# Patient Record
Sex: FEMALE | Race: WHITE | NOT HISPANIC OR LATINO | Employment: OTHER | URBAN - METROPOLITAN AREA
[De-identification: names, ages, dates, MRNs, and addresses within clinical notes are randomized per-mention and may not be internally consistent; named-entity substitution may affect disease eponyms.]

---

## 2017-01-20 PROBLEM — G47.00 INSOMNIA: Status: ACTIVE | Noted: 2017-01-20

## 2017-01-24 ENCOUNTER — APPOINTMENT (OUTPATIENT)
Dept: RADIOLOGY | Facility: MEDICAL CENTER | Age: 82
DRG: 640 | End: 2017-01-24
Attending: HOSPITALIST
Payer: MEDICARE

## 2017-01-24 PROCEDURE — 71010 DX-CHEST-PORTABLE (1 VIEW): CPT

## 2017-02-01 PROBLEM — R10.9 ABDOMINAL PAIN: Status: ACTIVE | Noted: 2017-02-01

## 2017-02-18 ENCOUNTER — APPOINTMENT (OUTPATIENT)
Dept: RADIOLOGY | Facility: MEDICAL CENTER | Age: 82
DRG: 640 | End: 2017-02-18
Attending: HOSPITALIST
Payer: MEDICARE

## 2017-02-18 PROCEDURE — 71010 DX-CHEST-PORTABLE (1 VIEW): CPT

## 2017-02-19 ENCOUNTER — APPOINTMENT (OUTPATIENT)
Dept: RADIOLOGY | Facility: MEDICAL CENTER | Age: 82
DRG: 640 | End: 2017-02-19
Attending: INTERNAL MEDICINE
Payer: MEDICARE

## 2017-02-19 PROCEDURE — 71010 DX-CHEST-PORTABLE (1 VIEW): CPT

## 2017-04-11 PROBLEM — R10.9 ABDOMINAL PAIN: Status: RESOLVED | Noted: 2017-02-01 | Resolved: 2017-04-11

## 2017-04-18 PROBLEM — E87.6 HYPOKALEMIA: Status: ACTIVE | Noted: 2017-04-18

## 2017-04-18 PROBLEM — G89.29 CHRONIC PAIN: Status: ACTIVE | Noted: 2017-04-18

## 2017-04-18 PROBLEM — N39.0 UTI (URINARY TRACT INFECTION): Status: ACTIVE | Noted: 2017-04-18

## 2017-04-18 PROBLEM — E83.42 HYPOMAGNESEMIA: Status: ACTIVE | Noted: 2017-04-18

## 2017-04-25 PROBLEM — R35.0 URINARY FREQUENCY: Status: ACTIVE | Noted: 2017-04-25

## 2017-04-25 PROBLEM — E53.8 VITAMIN B12 DEFICIENCY: Status: ACTIVE | Noted: 2017-04-25

## 2017-04-25 PROBLEM — G92.8 TOXIC METABOLIC ENCEPHALOPATHY: Status: ACTIVE | Noted: 2017-04-25

## 2017-04-25 PROBLEM — Z71.89 DNR (DO NOT RESUSCITATE) DISCUSSION: Status: ACTIVE | Noted: 2017-04-25

## 2017-04-25 PROBLEM — E86.0 DEHYDRATION: Status: ACTIVE | Noted: 2017-04-25

## 2017-04-27 PROBLEM — R53.2 FUNCTIONAL QUADRIPLEGIA (HCC): Status: ACTIVE | Noted: 2017-04-27

## 2017-05-17 ENCOUNTER — APPOINTMENT (OUTPATIENT)
Dept: RADIOLOGY | Facility: MEDICAL CENTER | Age: 82
DRG: 640 | End: 2017-05-17
Attending: HOSPITALIST
Payer: MEDICARE

## 2017-05-18 ENCOUNTER — APPOINTMENT (OUTPATIENT)
Dept: RADIOLOGY | Facility: MEDICAL CENTER | Age: 82
DRG: 640 | End: 2017-05-18
Attending: HOSPITALIST
Payer: MEDICARE

## 2017-05-18 PROCEDURE — 77077 JOINT SURVEY SINGLE VIEW: CPT

## 2017-05-18 PROCEDURE — 74230 X-RAY XM SWLNG FUNCJ C+: CPT

## 2017-05-22 PROBLEM — M85.80 OSTEOPENIA: Status: ACTIVE | Noted: 2017-05-22

## 2017-05-22 PROBLEM — R53.1 GENERALIZED WEAKNESS: Status: ACTIVE | Noted: 2017-05-22

## 2017-05-22 PROBLEM — E55.9 VITAMIN D DEFICIENCY: Status: ACTIVE | Noted: 2017-05-22

## 2017-06-06 PROBLEM — E83.42 HYPOMAGNESEMIA: Status: RESOLVED | Noted: 2017-04-18 | Resolved: 2017-06-06

## 2017-06-06 PROBLEM — E86.0 DEHYDRATION: Status: RESOLVED | Noted: 2017-04-25 | Resolved: 2017-06-06

## 2017-06-06 PROBLEM — R53.1 GENERALIZED WEAKNESS: Status: RESOLVED | Noted: 2017-05-22 | Resolved: 2017-06-06

## 2017-06-06 PROBLEM — E87.6 HYPOKALEMIA: Status: RESOLVED | Noted: 2017-04-18 | Resolved: 2017-06-06

## 2017-06-07 PROBLEM — G92.8 TOXIC METABOLIC ENCEPHALOPATHY: Status: RESOLVED | Noted: 2017-04-25 | Resolved: 2017-06-07

## 2017-06-07 PROBLEM — M06.9 RA (RHEUMATOID ARTHRITIS) (HCC): Status: ACTIVE | Noted: 2017-06-07

## 2017-06-08 PROBLEM — R53.81 PHYSICAL DEBILITY: Status: ACTIVE | Noted: 2017-06-08

## 2017-06-08 PROBLEM — E21.3 HYPERPARATHYROIDISM (HCC): Status: ACTIVE | Noted: 2017-06-08

## 2017-06-09 PROBLEM — E53.8 VITAMIN B12 DEFICIENCY: Status: RESOLVED | Noted: 2017-04-25 | Resolved: 2017-06-09

## 2017-06-11 PROBLEM — I10 HTN (HYPERTENSION): Status: ACTIVE | Noted: 2017-06-11

## 2017-06-18 ENCOUNTER — APPOINTMENT (OUTPATIENT)
Dept: RADIOLOGY | Facility: MEDICAL CENTER | Age: 82
DRG: 640 | End: 2017-06-18
Attending: INTERNAL MEDICINE
Payer: MEDICARE

## 2017-06-18 PROBLEM — R09.02 HYPOXIA: Status: ACTIVE | Noted: 2017-06-18

## 2017-06-18 PROBLEM — N39.0 UTI (URINARY TRACT INFECTION): Status: RESOLVED | Noted: 2017-04-18 | Resolved: 2017-06-18

## 2017-06-18 PROCEDURE — 71010 DX-CHEST-PORTABLE (1 VIEW): CPT

## 2017-06-29 ENCOUNTER — HOSPITAL ENCOUNTER (OUTPATIENT)
Dept: LAB | Facility: MEDICAL CENTER | Age: 82
End: 2017-06-29
Attending: INTERNAL MEDICINE
Payer: COMMERCIAL

## 2017-06-29 LAB
ANION GAP SERPL CALC-SCNC: 2 MMOL/L (ref 0–11.9)
BASOPHILS # BLD AUTO: 0.4 % (ref 0–1.8)
BASOPHILS # BLD: 0.04 K/UL (ref 0–0.12)
BUN SERPL-MCNC: 9 MG/DL (ref 8–22)
CALCIUM SERPL-MCNC: 9.2 MG/DL (ref 8.5–10.5)
CHLORIDE SERPL-SCNC: 101 MMOL/L (ref 96–112)
CO2 SERPL-SCNC: 31 MMOL/L (ref 20–33)
CREAT SERPL-MCNC: 0.66 MG/DL (ref 0.5–1.4)
EOSINOPHIL # BLD AUTO: 0.58 K/UL (ref 0–0.51)
EOSINOPHIL NFR BLD: 6.1 % (ref 0–6.9)
ERYTHROCYTE [DISTWIDTH] IN BLOOD BY AUTOMATED COUNT: 44.6 FL (ref 35.9–50)
GFR SERPL CREATININE-BSD FRML MDRD: >60 ML/MIN/1.73 M 2
GLUCOSE SERPL-MCNC: 86 MG/DL (ref 65–99)
HCT VFR BLD AUTO: 36.4 % (ref 37–47)
HGB BLD-MCNC: 11.7 G/DL (ref 12–16)
IMM GRANULOCYTES # BLD AUTO: 0.02 K/UL (ref 0–0.11)
IMM GRANULOCYTES NFR BLD AUTO: 0.2 % (ref 0–0.9)
LYMPHOCYTES # BLD AUTO: 4.4 K/UL (ref 1–4.8)
LYMPHOCYTES NFR BLD: 46.3 % (ref 22–41)
MCH RBC QN AUTO: 29.8 PG (ref 27–33)
MCHC RBC AUTO-ENTMCNC: 32.1 G/DL (ref 33.6–35)
MCV RBC AUTO: 92.9 FL (ref 81.4–97.8)
MONOCYTES # BLD AUTO: 0.82 K/UL (ref 0–0.85)
MONOCYTES NFR BLD AUTO: 8.6 % (ref 0–13.4)
NEUTROPHILS # BLD AUTO: 3.64 K/UL (ref 2–7.15)
NEUTROPHILS NFR BLD: 38.4 % (ref 44–72)
NRBC # BLD AUTO: 0 K/UL
NRBC BLD AUTO-RTO: 0 /100 WBC
PLATELET # BLD AUTO: 229 K/UL (ref 164–446)
PMV BLD AUTO: 10.3 FL (ref 9–12.9)
POTASSIUM SERPL-SCNC: 4 MMOL/L (ref 3.6–5.5)
RBC # BLD AUTO: 3.92 M/UL (ref 4.2–5.4)
SODIUM SERPL-SCNC: 134 MMOL/L (ref 135–145)
WBC # BLD AUTO: 9.5 K/UL (ref 4.8–10.8)

## 2017-07-01 ENCOUNTER — HOSPITAL ENCOUNTER (OUTPATIENT)
Dept: LAB | Facility: MEDICAL CENTER | Age: 82
End: 2017-07-01
Attending: INTERNAL MEDICINE
Payer: MEDICARE

## 2017-07-05 LAB
CHOLEST SERPL-MCNC: 98 MG/DL (ref 100–199)
HDLC SERPL-MCNC: 28 MG/DL
LDLC SERPL CALC-MCNC: 47 MG/DL
TRIGL SERPL-MCNC: 113 MG/DL (ref 0–149)

## 2017-07-05 PROCEDURE — 80061 LIPID PANEL: CPT

## 2017-07-05 PROCEDURE — 36415 COLL VENOUS BLD VENIPUNCTURE: CPT

## 2017-08-01 ENCOUNTER — HOSPITAL ENCOUNTER (OUTPATIENT)
Dept: LAB | Facility: MEDICAL CENTER | Age: 82
End: 2017-08-01
Attending: INTERNAL MEDICINE
Payer: MEDICARE

## 2017-09-01 ENCOUNTER — HOSPITAL ENCOUNTER (OUTPATIENT)
Dept: LAB | Facility: MEDICAL CENTER | Age: 82
End: 2017-09-01
Attending: INTERNAL MEDICINE
Payer: MEDICARE

## 2017-10-01 ENCOUNTER — HOSPITAL ENCOUNTER (OUTPATIENT)
Dept: LAB | Facility: MEDICAL CENTER | Age: 82
End: 2017-10-01
Attending: INTERNAL MEDICINE
Payer: MEDICARE

## 2017-10-09 ENCOUNTER — HOME HEALTH ADMISSION (OUTPATIENT)
Dept: HOME HEALTH SERVICES | Facility: HOME HEALTHCARE | Age: 82
End: 2017-10-09
Payer: MEDICARE

## 2017-10-11 ENCOUNTER — HOME CARE VISIT (OUTPATIENT)
Dept: HOME HEALTH SERVICES | Facility: HOME HEALTHCARE | Age: 82
End: 2017-10-11
Payer: MEDICARE

## 2017-10-11 VITALS
RESPIRATION RATE: 18 BRPM | TEMPERATURE: 99.1 F | WEIGHT: 182 LBS | HEART RATE: 78 BPM | DIASTOLIC BLOOD PRESSURE: 60 MMHG | BODY MASS INDEX: 32.25 KG/M2 | SYSTOLIC BLOOD PRESSURE: 130 MMHG | HEIGHT: 63 IN | OXYGEN SATURATION: 92 %

## 2017-10-11 PROCEDURE — 665001 SOC-HOME HEALTH

## 2017-10-11 PROCEDURE — G0162 HHC RN E&M PLAN SVS, 15 MIN: HCPCS

## 2017-10-11 SDOH — ECONOMIC STABILITY: HOUSING INSECURITY: UNSAFE APPLIANCES: 0

## 2017-10-11 SDOH — ECONOMIC STABILITY: HOUSING INSECURITY: UNSAFE COOKING RANGE AREA: 0

## 2017-10-11 ASSESSMENT — PATIENT HEALTH QUESTIONNAIRE - PHQ9
1. LITTLE INTEREST OR PLEASURE IN DOING THINGS: 00
2. FEELING DOWN, DEPRESSED, IRRITABLE, OR HOPELESS: 00

## 2017-10-11 ASSESSMENT — ACTIVITIES OF DAILY LIVING (ADL)
HOME_HEALTH_OASIS: 01
OASIS_M1830: 06

## 2017-10-12 ENCOUNTER — HOME CARE VISIT (OUTPATIENT)
Dept: HOME HEALTH SERVICES | Facility: HOME HEALTHCARE | Age: 82
End: 2017-10-12
Payer: MEDICARE

## 2017-10-12 VITALS
TEMPERATURE: 99.7 F | RESPIRATION RATE: 16 BRPM | HEART RATE: 83 BPM | SYSTOLIC BLOOD PRESSURE: 120 MMHG | DIASTOLIC BLOOD PRESSURE: 60 MMHG | OXYGEN SATURATION: 95 %

## 2017-10-12 PROCEDURE — G0151 HHCP-SERV OF PT,EA 15 MIN: HCPCS

## 2017-10-12 SDOH — ECONOMIC STABILITY: HOUSING INSECURITY: UNSAFE APPLIANCES: 0

## 2017-10-12 SDOH — ECONOMIC STABILITY: HOUSING INSECURITY: UNSAFE COOKING RANGE AREA: 0

## 2017-10-12 ASSESSMENT — ACTIVITIES OF DAILY LIVING (ADL)
MEAL_PREP_ASSISTANCE: 6
BATHING_ASSISTANCE: 6
DRESSING_UB_ASSISTANCE: 6
HOUSEKEEPING_ASSISTANCE: 6
TELEPHONE_ASSISTANCE: 6
EATING_ASSISTANCE: 0
GROOMING_ASSISTANCE: 0
SHOPPING_ASSISTANCE: 6
ORAL_CARE_ASSISTANCE: 0
TOILETING_ASSISTANCE: 6
DRESSING_LB_ASSISTANCE: 6
TRANSPORTATION_ASSISTANCE: 6
GROOMING_ASSISTANCE: 1
LAUNDRY_ASSISTANCE: 6

## 2017-10-13 SDOH — ECONOMIC STABILITY: HOUSING INSECURITY: HOME SAFETY: AS NEEDED. HAS GRAB BARS/RAILS AS WELL.

## 2017-10-13 SDOH — ECONOMIC STABILITY: HOUSING INSECURITY
HOME SAFETY: PT LIVES WITH HUSBAND AND HAS A FRIEND THAT IS A CAREGIVER THAT COMES OVER TO HELP WITH SHOWERS AND HELPS PT AS NEEDED WITH CHANGING, THEY HAVE A FEW STEPS INTO HOME BUT THEY ALSO HAVE  RAMP THAT HE IS ABLE PLACE ON THE STEPS AND TAKE DOWN EACH TIME

## 2017-10-16 ENCOUNTER — HOME CARE VISIT (OUTPATIENT)
Dept: HOME HEALTH SERVICES | Facility: HOME HEALTHCARE | Age: 82
End: 2017-10-16
Payer: MEDICARE

## 2017-10-16 VITALS
TEMPERATURE: 100 F | RESPIRATION RATE: 18 BRPM | HEART RATE: 84 BPM | DIASTOLIC BLOOD PRESSURE: 80 MMHG | OXYGEN SATURATION: 94 % | SYSTOLIC BLOOD PRESSURE: 128 MMHG

## 2017-10-16 VITALS
TEMPERATURE: 99.5 F | OXYGEN SATURATION: 93 % | HEART RATE: 96 BPM | RESPIRATION RATE: 18 BRPM | DIASTOLIC BLOOD PRESSURE: 80 MMHG | SYSTOLIC BLOOD PRESSURE: 130 MMHG

## 2017-10-16 PROCEDURE — G0151 HHCP-SERV OF PT,EA 15 MIN: HCPCS

## 2017-10-16 PROCEDURE — G0152 HHCP-SERV OF OT,EA 15 MIN: HCPCS

## 2017-10-16 ASSESSMENT — ACTIVITIES OF DAILY LIVING (ADL)
TRANSPORTATION_ASSISTANCE: 6
EATING_ASSISTANCE: 0
TELEPHONE_ASSISTANCE: 0
BATHING_ASSISTANCE: 6
MEAL_PREP_ASSISTANCE: 6
ORAL_CARE_ASSISTANCE: 1
TOILETING_ASSISTANCE: 6
DRESSING_LB_ASSISTANCE: 6
SHOPPING_ASSISTANCE: 6
GROOMING_ASSISTANCE: 1
HOUSEKEEPING_ASSISTANCE: 6
LAUNDRY_ASSISTANCE: 6
DRESSING_UB_ASSISTANCE: 5

## 2017-10-17 ENCOUNTER — HOME CARE VISIT (OUTPATIENT)
Dept: HOME HEALTH SERVICES | Facility: HOME HEALTHCARE | Age: 82
End: 2017-10-17
Payer: MEDICARE

## 2017-10-17 VITALS
TEMPERATURE: 97.9 F | RESPIRATION RATE: 18 BRPM | DIASTOLIC BLOOD PRESSURE: 66 MMHG | HEART RATE: 76 BPM | OXYGEN SATURATION: 92 % | SYSTOLIC BLOOD PRESSURE: 118 MMHG

## 2017-10-17 PROCEDURE — G0495 RN CARE TRAIN/EDU IN HH: HCPCS

## 2017-10-17 SDOH — ECONOMIC STABILITY: HOUSING INSECURITY: UNSAFE COOKING RANGE AREA: 0

## 2017-10-17 SDOH — ECONOMIC STABILITY: HOUSING INSECURITY: UNSAFE APPLIANCES: 0

## 2017-10-17 ASSESSMENT — ENCOUNTER SYMPTOMS
NAUSEA: DENIED
VOMITING: DENIED

## 2017-10-17 ASSESSMENT — ACTIVITIES OF DAILY LIVING (ADL): TRANSPORTATION COMMENTS: NEEDS ONE ASSIST TO LEAVE HOME SAFELY

## 2017-10-18 ENCOUNTER — HOME CARE VISIT (OUTPATIENT)
Dept: HOME HEALTH SERVICES | Facility: HOME HEALTHCARE | Age: 82
End: 2017-10-18
Payer: MEDICARE

## 2017-10-18 ENCOUNTER — OFFICE VISIT (OUTPATIENT)
Dept: MEDICAL GROUP | Facility: MEDICAL CENTER | Age: 82
End: 2017-10-18
Payer: MEDICARE

## 2017-10-18 VITALS
HEIGHT: 63 IN | RESPIRATION RATE: 16 BRPM | DIASTOLIC BLOOD PRESSURE: 60 MMHG | OXYGEN SATURATION: 96 % | WEIGHT: 182 LBS | HEART RATE: 89 BPM | BODY MASS INDEX: 32.25 KG/M2 | TEMPERATURE: 98.5 F | SYSTOLIC BLOOD PRESSURE: 100 MMHG

## 2017-10-18 VITALS
RESPIRATION RATE: 18 BRPM | HEART RATE: 77 BPM | SYSTOLIC BLOOD PRESSURE: 114 MMHG | DIASTOLIC BLOOD PRESSURE: 60 MMHG | OXYGEN SATURATION: 92 % | TEMPERATURE: 98.7 F

## 2017-10-18 VITALS
DIASTOLIC BLOOD PRESSURE: 60 MMHG | SYSTOLIC BLOOD PRESSURE: 114 MMHG | RESPIRATION RATE: 18 BRPM | OXYGEN SATURATION: 92 % | HEART RATE: 77 BPM | TEMPERATURE: 98.7 F

## 2017-10-18 DIAGNOSIS — M79.642 PAIN OF LEFT HAND: ICD-10-CM

## 2017-10-18 DIAGNOSIS — Z23 INFLUENZA VACCINE NEEDED: ICD-10-CM

## 2017-10-18 DIAGNOSIS — E03.9 ACQUIRED HYPOTHYROIDISM: ICD-10-CM

## 2017-10-18 DIAGNOSIS — E78.5 DYSLIPIDEMIA: ICD-10-CM

## 2017-10-18 DIAGNOSIS — I10 ESSENTIAL HYPERTENSION: ICD-10-CM

## 2017-10-18 DIAGNOSIS — E66.9 OBESITY (BMI 30-39.9): ICD-10-CM

## 2017-10-18 PROBLEM — R09.02 HYPOXIA: Status: RESOLVED | Noted: 2017-06-18 | Resolved: 2017-10-18

## 2017-10-18 PROBLEM — R53.81 PHYSICAL DEBILITY: Status: RESOLVED | Noted: 2017-06-08 | Resolved: 2017-10-18

## 2017-10-18 PROCEDURE — 99214 OFFICE O/P EST MOD 30 MIN: CPT | Mod: 25 | Performed by: INTERNAL MEDICINE

## 2017-10-18 PROCEDURE — G0008 ADMIN INFLUENZA VIRUS VAC: HCPCS | Performed by: INTERNAL MEDICINE

## 2017-10-18 PROCEDURE — 90662 IIV NO PRSV INCREASED AG IM: CPT | Performed by: INTERNAL MEDICINE

## 2017-10-18 PROCEDURE — G0151 HHCP-SERV OF PT,EA 15 MIN: HCPCS

## 2017-10-18 PROCEDURE — G0156 HHCP-SVS OF AIDE,EA 15 MIN: HCPCS

## 2017-10-18 RX ORDER — LATANOPROST 50 UG/ML
1 SOLUTION/ DROPS OPHTHALMIC NIGHTLY
COMMUNITY

## 2017-10-18 RX ORDER — LEVOTHYROXINE SODIUM 88 UG/1
88 TABLET ORAL
Qty: 90 TAB | Refills: 3 | Status: SHIPPED | OUTPATIENT
Start: 2017-10-18

## 2017-10-18 ASSESSMENT — PATIENT HEALTH QUESTIONNAIRE - PHQ9: CLINICAL INTERPRETATION OF PHQ2 SCORE: 0

## 2017-10-18 NOTE — PROGRESS NOTES
CC: Establishing care multiple issues    HPI:   Bing presents today with the following.    1. Obesity (BMI 30-39.9)  Weight has gone down the last year she recently was discharged from extended care facility.    2. Acquired hypothyroidism  Maintain on medications coming due for recheck. Denying any other skin changes.    3. Essential hypertension  Blood pressure on the low when well controlled on current medications currently not on diuretic which she reports she's been on the past for lymphedema.    4. Dyslipidemia  Previously on statin not discharge on medication no recent blood work.    5. Pain of left hand  Pain in left hand with contractures requesting referral to surgical specialist unclear who they're trying to get to.    6. Influenza vaccine needed      Patient Active Problem List    Diagnosis Date Noted   • DNR (do not resuscitate) discussion 04/25/2017     Priority: High   • Dislocation of prosthetic joint (CMS-HCC) 02/23/2016     Priority: High   • Knee pain, acute 02/21/2016     Priority: High   • Glaucoma 03/15/2015     Priority: High   • Personal history of breast cancer 03/15/2015     Priority: High   • Dysphagia 11/18/2016     Priority: Medium   • CKD (chronic kidney disease), stage III 09/08/2016     Priority: Medium   • OAB (overactive bladder) 09/15/2015     Priority: Medium   • Macrocytosis 03/15/2015     Priority: Medium   • Glucose intolerance (impaired glucose tolerance) 12/05/2013     Priority: Medium   • Chronic pain 04/18/2017     Priority: Low   • Acquired hypothyroidism 09/08/2016     Priority: Low   • Lymphedema 08/19/2014     Priority: Low     Class: Chronic   • Obesity (BMI 30.0-34.9) 12/05/2013     Priority: Low   • Essential hypertension 06/11/2017   • Hyperparathyroidism (CMS-HCC) 06/08/2017   • RA (rheumatoid arthritis) (CMS-HCC) 06/07/2017   • Vitamin D deficiency 05/22/2017   • Osteopenia 05/22/2017   • Functional quadriplegia (CMS-HCC) 04/27/2017   • Insomnia 01/20/2017   •  "Myopathy 10/21/2016   • Neuropathy (CMS-[facility]) 10/03/2016   • Acute pain of right knee 01/14/2016   • Osteoarthritis, multiple sites 06/04/2015   • Lumbar disc disease 05/07/2015       Current Outpatient Prescriptions   Medication Sig Dispense Refill   • latanoprost (XALATAN) 0.005 % Solution Place 1 Drop in both eyes every evening.     • levothyroxine (SYNTHROID) 88 MCG Tab Take 1 Tab by mouth Every morning on an empty stomach. 90 Tab 3   • gabapentin (NEURONTIN) 400 MG Cap Take 1 Cap by mouth 3 times a day. 90 Cap    • cyanocobalamin (VITAMIN B-12) 1000 MCG/ML Solution 1 mL by Intramuscular route every 30 days.  0   • vitamin D 2000 UNIT Tab Take 1 Tab by mouth every day. 60 Tab    • carvedilol (COREG) 6.25 MG Tab Take 0.5 Tabs by mouth 2 times a day, with meals. (Patient taking differently: Take 6.25 mg by mouth 2 times a day, with meals.) 60 Tab 2     No current facility-administered medications for this visit.          Allergies as of 10/18/2017 - Reviewed 10/18/2017   Allergen Reaction Noted   • Alcohol Shortness of Breath 07/27/2010   • Aspirin Rash 07/01/2011   • Tape Contact Dermatitis 07/27/2010        ROS: As per HPI.    /60   Pulse 89   Temp 36.9 °C (98.5 °F)   Resp 16   Ht 1.6 m (5' 3\")   Wt 82.6 kg (182 lb) Comment: wheelchair bound  SpO2 96%   BMI 32.24 kg/m²     Physical Exam:  Gen:         Alert and oriented, No apparent distress.  Neck:        No Lymphadenopathy or Bruits.  Lungs:     Clear to auscultation bilaterally  CV:          Regular rate and rhythm. No murmurs, rubs or gallops.               Ext:          No clubbing, cyanosis, edema.      Assessment and Plan.   87 y.o. female with the following issues.    1. Obesity (BMI 30-39.9)  Discussed diet exercise and salt restriction will follow up in one month after blood work.  - Patient identified as having weight management issue.  Appropriate orders and counseling given.    2. Acquired hypothyroidism  Refilled medications rechecking " blood work.  - levothyroxine (SYNTHROID) 88 MCG Tab; Take 1 Tab by mouth Every morning on an empty stomach.  Dispense: 90 Tab; Refill: 3  - TSH; Future    3. Essential hypertension  Currently well controlled, Discuss diet, exercise and salt restriction.  - CBC WITH DIFFERENTIAL; Future    4. Dyslipidemia  Recheck cholesterol  - COMP METABOLIC PANEL; Future  - LIPID PROFILE; Future    5. Pain of left hand   will let me know they're trying to get to and will place referral.    6. Influenza vaccine needed    - INFLUENZA VACCINE, HIGH DOSE (65+ ONLY)

## 2017-10-19 ENCOUNTER — HOME CARE VISIT (OUTPATIENT)
Dept: HOME HEALTH SERVICES | Facility: HOME HEALTHCARE | Age: 82
End: 2017-10-19
Payer: MEDICARE

## 2017-10-19 VITALS
TEMPERATURE: 100.5 F | RESPIRATION RATE: 18 BRPM | SYSTOLIC BLOOD PRESSURE: 125 MMHG | HEART RATE: 74 BPM | DIASTOLIC BLOOD PRESSURE: 80 MMHG | OXYGEN SATURATION: 94 %

## 2017-10-19 PROCEDURE — G0155 HHCP-SVS OF CSW,EA 15 MIN: HCPCS

## 2017-10-19 PROCEDURE — G0152 HHCP-SERV OF OT,EA 15 MIN: HCPCS

## 2017-10-20 ENCOUNTER — HOME CARE VISIT (OUTPATIENT)
Dept: HOME HEALTH SERVICES | Facility: HOME HEALTHCARE | Age: 82
End: 2017-10-20
Payer: MEDICARE

## 2017-10-20 VITALS
DIASTOLIC BLOOD PRESSURE: 75 MMHG | HEART RATE: 82 BPM | OXYGEN SATURATION: 94 % | RESPIRATION RATE: 18 BRPM | SYSTOLIC BLOOD PRESSURE: 131 MMHG | TEMPERATURE: 98.9 F

## 2017-10-20 PROCEDURE — G0156 HHCP-SVS OF AIDE,EA 15 MIN: HCPCS

## 2017-10-23 ENCOUNTER — HOME CARE VISIT (OUTPATIENT)
Dept: HOME HEALTH SERVICES | Facility: HOME HEALTHCARE | Age: 82
End: 2017-10-23
Payer: MEDICARE

## 2017-10-23 PROCEDURE — G0151 HHCP-SERV OF PT,EA 15 MIN: HCPCS

## 2017-10-23 ASSESSMENT — ACTIVITIES OF DAILY LIVING (ADL): HOME_HEALTH_OASIS: 01

## 2017-10-24 ENCOUNTER — HOME CARE VISIT (OUTPATIENT)
Dept: HOME HEALTH SERVICES | Facility: HOME HEALTHCARE | Age: 82
End: 2017-10-24
Payer: MEDICARE

## 2017-10-24 VITALS
SYSTOLIC BLOOD PRESSURE: 140 MMHG | HEART RATE: 76 BPM | OXYGEN SATURATION: 97 % | DIASTOLIC BLOOD PRESSURE: 80 MMHG | TEMPERATURE: 99.4 F | RESPIRATION RATE: 16 BRPM

## 2017-10-24 VITALS
DIASTOLIC BLOOD PRESSURE: 56 MMHG | SYSTOLIC BLOOD PRESSURE: 122 MMHG | RESPIRATION RATE: 18 BRPM | OXYGEN SATURATION: 96 % | HEART RATE: 66 BPM | TEMPERATURE: 98.4 F

## 2017-10-24 VITALS
SYSTOLIC BLOOD PRESSURE: 122 MMHG | OXYGEN SATURATION: 96 % | TEMPERATURE: 98.4 F | HEART RATE: 66 BPM | RESPIRATION RATE: 18 BRPM | DIASTOLIC BLOOD PRESSURE: 56 MMHG

## 2017-10-24 PROCEDURE — G0152 HHCP-SERV OF OT,EA 15 MIN: HCPCS

## 2017-10-24 PROCEDURE — G0155 HHCP-SVS OF CSW,EA 15 MIN: HCPCS

## 2017-10-24 PROCEDURE — G0496 LPN CARE TRAIN/EDU IN HH: HCPCS

## 2017-10-24 SDOH — ECONOMIC STABILITY: HOUSING INSECURITY: UNSAFE APPLIANCES: 0

## 2017-10-24 SDOH — ECONOMIC STABILITY: HOUSING INSECURITY: UNSAFE COOKING RANGE AREA: 0

## 2017-10-25 ENCOUNTER — HOME CARE VISIT (OUTPATIENT)
Dept: HOME HEALTH SERVICES | Facility: HOME HEALTHCARE | Age: 82
End: 2017-10-25
Payer: MEDICARE

## 2017-10-25 VITALS
OXYGEN SATURATION: 95 % | RESPIRATION RATE: 18 BRPM | TEMPERATURE: 98.6 F | SYSTOLIC BLOOD PRESSURE: 131 MMHG | DIASTOLIC BLOOD PRESSURE: 66 MMHG | HEART RATE: 81 BPM

## 2017-10-25 PROCEDURE — G0156 HHCP-SVS OF AIDE,EA 15 MIN: HCPCS

## 2017-10-26 ENCOUNTER — HOME CARE VISIT (OUTPATIENT)
Dept: HOME HEALTH SERVICES | Facility: HOME HEALTHCARE | Age: 82
End: 2017-10-26
Payer: MEDICARE

## 2017-10-26 PROCEDURE — G0152 HHCP-SERV OF OT,EA 15 MIN: HCPCS

## 2017-10-26 PROCEDURE — G0151 HHCP-SERV OF PT,EA 15 MIN: HCPCS

## 2017-10-27 ENCOUNTER — HOME CARE VISIT (OUTPATIENT)
Dept: HOME HEALTH SERVICES | Facility: HOME HEALTHCARE | Age: 82
End: 2017-10-27
Payer: MEDICARE

## 2017-10-27 VITALS
RESPIRATION RATE: 18 BRPM | HEART RATE: 75 BPM | OXYGEN SATURATION: 90 % | DIASTOLIC BLOOD PRESSURE: 70 MMHG | SYSTOLIC BLOOD PRESSURE: 130 MMHG | TEMPERATURE: 97.8 F

## 2017-10-27 VITALS
RESPIRATION RATE: 18 BRPM | HEART RATE: 75 BPM | TEMPERATURE: 97.8 F | SYSTOLIC BLOOD PRESSURE: 130 MMHG | OXYGEN SATURATION: 90 % | DIASTOLIC BLOOD PRESSURE: 70 MMHG

## 2017-10-27 VITALS
RESPIRATION RATE: 18 BRPM | OXYGEN SATURATION: 95 % | HEART RATE: 72 BPM | SYSTOLIC BLOOD PRESSURE: 132 MMHG | DIASTOLIC BLOOD PRESSURE: 77 MMHG | TEMPERATURE: 98.3 F

## 2017-10-27 PROCEDURE — G0156 HHCP-SVS OF AIDE,EA 15 MIN: HCPCS

## 2017-10-30 ENCOUNTER — HOME CARE VISIT (OUTPATIENT)
Dept: HOME HEALTH SERVICES | Facility: HOME HEALTHCARE | Age: 82
End: 2017-10-30
Payer: MEDICARE

## 2017-10-30 VITALS
TEMPERATURE: 99.4 F | RESPIRATION RATE: 16 BRPM | HEART RATE: 73 BPM | DIASTOLIC BLOOD PRESSURE: 78 MMHG | SYSTOLIC BLOOD PRESSURE: 132 MMHG | OXYGEN SATURATION: 95 %

## 2017-10-30 PROCEDURE — G0151 HHCP-SERV OF PT,EA 15 MIN: HCPCS

## 2017-10-31 ENCOUNTER — HOME CARE VISIT (OUTPATIENT)
Dept: HOME HEALTH SERVICES | Facility: HOME HEALTHCARE | Age: 82
End: 2017-10-31
Payer: MEDICARE

## 2017-10-31 VITALS
HEART RATE: 76 BPM | TEMPERATURE: 98.5 F | RESPIRATION RATE: 18 BRPM | OXYGEN SATURATION: 94 % | DIASTOLIC BLOOD PRESSURE: 73 MMHG | SYSTOLIC BLOOD PRESSURE: 123 MMHG

## 2017-10-31 VITALS
HEART RATE: 72 BPM | DIASTOLIC BLOOD PRESSURE: 66 MMHG | TEMPERATURE: 97.6 F | OXYGEN SATURATION: 94 % | RESPIRATION RATE: 18 BRPM | SYSTOLIC BLOOD PRESSURE: 120 MMHG

## 2017-10-31 PROCEDURE — G0156 HHCP-SVS OF AIDE,EA 15 MIN: HCPCS

## 2017-10-31 PROCEDURE — G0495 RN CARE TRAIN/EDU IN HH: HCPCS

## 2017-10-31 SDOH — ECONOMIC STABILITY: HOUSING INSECURITY: UNSAFE COOKING RANGE AREA: 0

## 2017-10-31 SDOH — ECONOMIC STABILITY: HOUSING INSECURITY: UNSAFE APPLIANCES: 0

## 2017-10-31 ASSESSMENT — ENCOUNTER SYMPTOMS
VOMITING: DENIED
NAUSEA: DENIED

## 2017-10-31 ASSESSMENT — ACTIVITIES OF DAILY LIVING (ADL): TRANSPORTATION COMMENTS: NEEDS ONE ASSIST TO LEAVE HOME SAFELY

## 2017-11-01 PROCEDURE — G0180 MD CERTIFICATION HHA PATIENT: HCPCS | Performed by: INTERNAL MEDICINE

## 2017-11-02 ENCOUNTER — HOME CARE VISIT (OUTPATIENT)
Dept: HOME HEALTH SERVICES | Facility: HOME HEALTHCARE | Age: 82
End: 2017-11-02
Payer: MEDICARE

## 2017-11-02 VITALS
OXYGEN SATURATION: 94 % | HEART RATE: 79 BPM | TEMPERATURE: 98.2 F | DIASTOLIC BLOOD PRESSURE: 74 MMHG | SYSTOLIC BLOOD PRESSURE: 123 MMHG | RESPIRATION RATE: 18 BRPM

## 2017-11-02 PROCEDURE — G0151 HHCP-SERV OF PT,EA 15 MIN: HCPCS

## 2017-11-02 PROCEDURE — G0156 HHCP-SVS OF AIDE,EA 15 MIN: HCPCS

## 2017-11-02 SDOH — ECONOMIC STABILITY: HOUSING INSECURITY: UNSAFE APPLIANCES: 0

## 2017-11-02 SDOH — ECONOMIC STABILITY: HOUSING INSECURITY: UNSAFE COOKING RANGE AREA: 0

## 2017-11-02 SDOH — ECONOMIC STABILITY: HOUSING INSECURITY
HOME SAFETY: PT LIVES WITH HUSBAND DON AND THEY HAVE A FEW STEPS INTO HOME BUT HE HAS A PROTABLE RAMP THAT HE PUTS UP IN THE GARAGE FOR GETTING PT OUT OF THE HOME, AND THEY STATE THAT IT HAS REALLY HELPED, THEY GOT IT FROM CARE CHEST.

## 2017-11-03 VITALS
SYSTOLIC BLOOD PRESSURE: 123 MMHG | RESPIRATION RATE: 18 BRPM | TEMPERATURE: 98.2 F | DIASTOLIC BLOOD PRESSURE: 74 MMHG | HEART RATE: 79 BPM | OXYGEN SATURATION: 94 %

## 2017-11-06 ENCOUNTER — NON-PROVIDER VISIT (OUTPATIENT)
Dept: MEDICAL GROUP | Facility: MEDICAL CENTER | Age: 82
End: 2017-11-06
Payer: MEDICARE

## 2017-11-06 ENCOUNTER — TELEPHONE (OUTPATIENT)
Dept: MEDICAL GROUP | Facility: MEDICAL CENTER | Age: 82
End: 2017-11-06

## 2017-11-06 DIAGNOSIS — E53.8 B12 DEFICIENCY: ICD-10-CM

## 2017-11-06 RX ORDER — CYANOCOBALAMIN 1000 UG/ML
1000 INJECTION, SOLUTION INTRAMUSCULAR; SUBCUTANEOUS
Qty: 3 VIAL | Refills: 3 | Status: ON HOLD | OUTPATIENT
Start: 2017-11-06 | End: 2018-05-08

## 2017-11-06 RX ORDER — CYANOCOBALAMIN 1000 UG/ML
1000 INJECTION, SOLUTION INTRAMUSCULAR; SUBCUTANEOUS
OUTPATIENT
Start: 2017-11-06 | End: 2018-11-01

## 2017-11-06 RX ADMIN — CYANOCOBALAMIN 1000 MCG: 1000 INJECTION, SOLUTION INTRAMUSCULAR; SUBCUTANEOUS at 13:58

## 2017-11-06 NOTE — PROGRESS NOTES
Bing Bernal is a 87 y.o. female here for a non-provider visit for b-12 injection.    Reason for injection:B-12 deficiency   Order in MAR?: Yes  Patient supplied?:No  Minimum interval has been met for this injection (per MAR order): Yes    Order and dose verified by: th  Patient tolerated injection and no adverse effects were observed or reported: Yes    # of Administrations remaining in MAR: 0

## 2017-11-06 NOTE — TELEPHONE ENCOUNTER
1. Caller Name: chiquita                                         Call Back Number: [phone] (home)         Patient approves a detailed voicemail message: yes    Patient  would like you to order b-12 injection so they can administer at home.since they have a hard time bringing her every month

## 2017-11-07 ENCOUNTER — HOME CARE VISIT (OUTPATIENT)
Dept: HOME HEALTH SERVICES | Facility: HOME HEALTHCARE | Age: 82
End: 2017-11-07
Payer: MEDICARE

## 2017-11-07 VITALS
HEART RATE: 76 BPM | SYSTOLIC BLOOD PRESSURE: 116 MMHG | TEMPERATURE: 98 F | OXYGEN SATURATION: 96 % | DIASTOLIC BLOOD PRESSURE: 60 MMHG

## 2017-11-07 PROCEDURE — G0156 HHCP-SVS OF AIDE,EA 15 MIN: HCPCS

## 2017-11-07 PROCEDURE — G0495 RN CARE TRAIN/EDU IN HH: HCPCS

## 2017-11-07 SDOH — ECONOMIC STABILITY: HOUSING INSECURITY: UNSAFE APPLIANCES: 0

## 2017-11-07 SDOH — ECONOMIC STABILITY: HOUSING INSECURITY: UNSAFE COOKING RANGE AREA: 0

## 2017-11-07 ASSESSMENT — ENCOUNTER SYMPTOMS
NAUSEA: DENIED
VOMITING: DENIED

## 2017-11-07 ASSESSMENT — ACTIVITIES OF DAILY LIVING (ADL): TRANSPORTATION COMMENTS: NEEDS ONE ASSIST TO LEAVE HOME SAFELY

## 2017-11-08 VITALS
TEMPERATURE: 98 F | RESPIRATION RATE: 18 BRPM | DIASTOLIC BLOOD PRESSURE: 60 MMHG | SYSTOLIC BLOOD PRESSURE: 116 MMHG | HEART RATE: 76 BPM | OXYGEN SATURATION: 96 %

## 2017-11-10 ENCOUNTER — HOME CARE VISIT (OUTPATIENT)
Dept: HOME HEALTH SERVICES | Facility: HOME HEALTHCARE | Age: 82
End: 2017-11-10
Payer: MEDICARE

## 2017-11-10 VITALS
TEMPERATURE: 98.3 F | RESPIRATION RATE: 18 BRPM | SYSTOLIC BLOOD PRESSURE: 138 MMHG | DIASTOLIC BLOOD PRESSURE: 84 MMHG | OXYGEN SATURATION: 94 % | HEART RATE: 77 BPM

## 2017-11-10 PROCEDURE — G0156 HHCP-SVS OF AIDE,EA 15 MIN: HCPCS

## 2017-11-14 ENCOUNTER — HOME CARE VISIT (OUTPATIENT)
Dept: HOME HEALTH SERVICES | Facility: HOME HEALTHCARE | Age: 82
End: 2017-11-14
Payer: MEDICARE

## 2017-11-14 VITALS
HEART RATE: 74 BPM | DIASTOLIC BLOOD PRESSURE: 62 MMHG | SYSTOLIC BLOOD PRESSURE: 110 MMHG | TEMPERATURE: 97.8 F | OXYGEN SATURATION: 92 % | RESPIRATION RATE: 18 BRPM

## 2017-11-14 PROCEDURE — G0162 HHC RN E&M PLAN SVS, 15 MIN: HCPCS

## 2017-11-14 SDOH — ECONOMIC STABILITY: HOUSING INSECURITY: UNSAFE APPLIANCES: 0

## 2017-11-14 SDOH — ECONOMIC STABILITY: HOUSING INSECURITY: UNSAFE COOKING RANGE AREA: 0

## 2017-11-14 ASSESSMENT — ACTIVITIES OF DAILY LIVING (ADL)
HOME_HEALTH_OASIS: 01
OASIS_M1830: 06
TRANSPORTATION COMMENTS: NEEDS ONE ASSIST TO LEAVE HOME SAFELY

## 2017-11-22 ENCOUNTER — HOSPITAL ENCOUNTER (OUTPATIENT)
Dept: LAB | Facility: MEDICAL CENTER | Age: 82
End: 2017-11-22
Attending: INTERNAL MEDICINE
Payer: MEDICARE

## 2017-11-22 DIAGNOSIS — E03.9 ACQUIRED HYPOTHYROIDISM: ICD-10-CM

## 2017-11-22 DIAGNOSIS — E78.5 DYSLIPIDEMIA: ICD-10-CM

## 2017-11-22 DIAGNOSIS — I10 ESSENTIAL HYPERTENSION: ICD-10-CM

## 2017-11-22 LAB
ALBUMIN SERPL BCP-MCNC: 3.5 G/DL (ref 3.2–4.9)
ALBUMIN/GLOB SERPL: 1.1 G/DL
ALP SERPL-CCNC: 77 U/L (ref 30–99)
ALT SERPL-CCNC: 10 U/L (ref 2–50)
ANION GAP SERPL CALC-SCNC: 10 MMOL/L (ref 0–11.9)
AST SERPL-CCNC: 29 U/L (ref 12–45)
BASOPHILS # BLD AUTO: 0.6 % (ref 0–1.8)
BASOPHILS # BLD: 0.06 K/UL (ref 0–0.12)
BILIRUB SERPL-MCNC: 0.4 MG/DL (ref 0.1–1.5)
BUN SERPL-MCNC: 23 MG/DL (ref 8–22)
CALCIUM SERPL-MCNC: 9.8 MG/DL (ref 8.5–10.5)
CHLORIDE SERPL-SCNC: 105 MMOL/L (ref 96–112)
CHOLEST SERPL-MCNC: 215 MG/DL (ref 100–199)
CO2 SERPL-SCNC: 22 MMOL/L (ref 20–33)
CREAT SERPL-MCNC: 0.68 MG/DL (ref 0.5–1.4)
EOSINOPHIL # BLD AUTO: 0.35 K/UL (ref 0–0.51)
EOSINOPHIL NFR BLD: 3.4 % (ref 0–6.9)
ERYTHROCYTE [DISTWIDTH] IN BLOOD BY AUTOMATED COUNT: 46.4 FL (ref 35.9–50)
GFR SERPL CREATININE-BSD FRML MDRD: >60 ML/MIN/1.73 M 2
GLOBULIN SER CALC-MCNC: 3.2 G/DL (ref 1.9–3.5)
GLUCOSE SERPL-MCNC: 84 MG/DL (ref 65–99)
HCT VFR BLD AUTO: 46.9 % (ref 37–47)
HDLC SERPL-MCNC: 28 MG/DL
HGB BLD-MCNC: 15.2 G/DL (ref 12–16)
IMM GRANULOCYTES # BLD AUTO: 0.02 K/UL (ref 0–0.11)
IMM GRANULOCYTES NFR BLD AUTO: 0.2 % (ref 0–0.9)
LDLC SERPL CALC-MCNC: 144 MG/DL
LYMPHOCYTES # BLD AUTO: 4.31 K/UL (ref 1–4.8)
LYMPHOCYTES NFR BLD: 42.4 % (ref 22–41)
MCH RBC QN AUTO: 31 PG (ref 27–33)
MCHC RBC AUTO-ENTMCNC: 32.4 G/DL (ref 33.6–35)
MCV RBC AUTO: 95.7 FL (ref 81.4–97.8)
MONOCYTES # BLD AUTO: 0.64 K/UL (ref 0–0.85)
MONOCYTES NFR BLD AUTO: 6.3 % (ref 0–13.4)
NEUTROPHILS # BLD AUTO: 4.79 K/UL (ref 2–7.15)
NEUTROPHILS NFR BLD: 47.1 % (ref 44–72)
NRBC # BLD AUTO: 0 K/UL
NRBC BLD AUTO-RTO: 0 /100 WBC
PLATELET # BLD AUTO: 244 K/UL (ref 164–446)
PMV BLD AUTO: 10.3 FL (ref 9–12.9)
POTASSIUM SERPL-SCNC: 4.5 MMOL/L (ref 3.6–5.5)
PROT SERPL-MCNC: 6.7 G/DL (ref 6–8.2)
RBC # BLD AUTO: 4.9 M/UL (ref 4.2–5.4)
SODIUM SERPL-SCNC: 137 MMOL/L (ref 135–145)
TRIGL SERPL-MCNC: 215 MG/DL (ref 0–149)
TSH SERPL DL<=0.005 MIU/L-ACNC: 1.54 UIU/ML (ref 0.3–3.7)
WBC # BLD AUTO: 10.2 K/UL (ref 4.8–10.8)

## 2017-11-22 PROCEDURE — 80061 LIPID PANEL: CPT

## 2017-11-22 PROCEDURE — 84443 ASSAY THYROID STIM HORMONE: CPT

## 2017-11-22 PROCEDURE — 36415 COLL VENOUS BLD VENIPUNCTURE: CPT

## 2017-11-22 PROCEDURE — 80053 COMPREHEN METABOLIC PANEL: CPT

## 2017-11-22 PROCEDURE — 85025 COMPLETE CBC W/AUTO DIFF WBC: CPT

## 2017-11-27 RX ORDER — GABAPENTIN 400 MG/1
400 CAPSULE ORAL 3 TIMES DAILY
Qty: 90 CAP | Refills: 11 | Status: SHIPPED | OUTPATIENT
Start: 2017-11-27 | End: 2017-11-29 | Stop reason: SDUPTHER

## 2017-11-29 ENCOUNTER — OFFICE VISIT (OUTPATIENT)
Dept: MEDICAL GROUP | Facility: MEDICAL CENTER | Age: 82
End: 2017-11-29
Payer: MEDICARE

## 2017-11-29 VITALS
TEMPERATURE: 97.2 F | HEIGHT: 63 IN | WEIGHT: 182 LBS | DIASTOLIC BLOOD PRESSURE: 70 MMHG | OXYGEN SATURATION: 92 % | RESPIRATION RATE: 16 BRPM | BODY MASS INDEX: 32.25 KG/M2 | HEART RATE: 80 BPM | SYSTOLIC BLOOD PRESSURE: 138 MMHG

## 2017-11-29 DIAGNOSIS — G89.4 CHRONIC PAIN SYNDROME: ICD-10-CM

## 2017-11-29 DIAGNOSIS — I10 ESSENTIAL HYPERTENSION: ICD-10-CM

## 2017-11-29 DIAGNOSIS — E78.5 DYSLIPIDEMIA: ICD-10-CM

## 2017-11-29 PROCEDURE — 99213 OFFICE O/P EST LOW 20 MIN: CPT | Performed by: NURSE PRACTITIONER

## 2017-11-29 RX ORDER — GABAPENTIN 400 MG/1
400 CAPSULE ORAL 3 TIMES DAILY
Qty: 270 CAP | Refills: 3 | Status: SHIPPED | OUTPATIENT
Start: 2017-11-29

## 2017-11-29 RX ORDER — LOVASTATIN 20 MG/1
20 TABLET ORAL DAILY
Qty: 90 TAB | Refills: 3 | Status: SHIPPED | OUTPATIENT
Start: 2017-11-29

## 2017-11-29 RX ORDER — CARVEDILOL 6.25 MG/1
6.25 TABLET ORAL 2 TIMES DAILY WITH MEALS
Qty: 180 TAB | Refills: 3 | Status: SHIPPED | OUTPATIENT
Start: 2017-11-29

## 2017-11-30 ASSESSMENT — ENCOUNTER SYMPTOMS: MYALGIAS: 1

## 2017-11-30 NOTE — PROGRESS NOTES
Subjective:      Bing Bernal is a 87 y.o. female who presents with Results (lab results )            HPI Bing Bernal Reji patient of Dr. Donald here today accompanied by her  for follow-up on lab work in need of medication.      1. Dyslipidemia  Patient's recent cholesterol levels came back showing high LDL at 144, total cholesterol 2:15 and triglycerides of 215. HDL was low. Previous cholesterol levels were very good with normal LDL and total cholesterol. When questioned about this, patient stated she had stopped using her lovastatin. She had no problems with the medicines and is willing to go back on the medicine.  - lovastatin (MEVACOR) 20 MG Tab; Take 1 Tab by mouth every day.  Dispense: 90 Tab; Refill: 3    2. Chronic pain syndrome  Patient would like to continue with gabapentin which she takes 3 times a day for chronic pain. She would like a 3 month supply at a time.  - gabapentin (NEURONTIN) 400 MG Cap; Take 1 Cap by mouth 3 times a day.  Dispense: 270 Cap; Refill: 3    3. Essential hypertension  Patient on carvedilol posthospitalization but it shows both the lower 3 mg dosage as well as the 6 mg dosage and patient is not sure which one she uses twice a day.  - carvedilol (COREG) 6.25 MG Tab; Take 1 Tab by mouth 2 times a day, with meals.  Dispense: 180 Tab; Refill: 3  Social History   Substance Use Topics   • Smoking status: Never Smoker   • Smokeless tobacco: Never Used   • Alcohol use No     Current Outpatient Prescriptions   Medication Sig Dispense Refill   • gabapentin (NEURONTIN) 400 MG Cap Take 1 Cap by mouth 3 times a day. 270 Cap 3   • lovastatin (MEVACOR) 20 MG Tab Take 1 Tab by mouth every day. 90 Tab 3   • carvedilol (COREG) 6.25 MG Tab Take 1 Tab by mouth 2 times a day, with meals. 180 Tab 3   • cyanocobalamin (VITAMIN B-12) 1000 MCG/ML Solution 1 mL by Intramuscular route every 30 days. With syringes 3 Vial 3   • latanoprost (XALATAN) 0.005 % Solution Place 1 Drop in  "both eyes every evening.     • levothyroxine (SYNTHROID) 88 MCG Tab Take 1 Tab by mouth Every morning on an empty stomach. 90 Tab 3   • cyanocobalamin (VITAMIN B-12) 1000 MCG/ML Solution 1 mL by Intramuscular route every 30 days.  0   • vitamin D 2000 UNIT Tab Take 1 Tab by mouth every day. 60 Tab      Current Facility-Administered Medications   Medication Dose Route Frequency Provider Last Rate Last Dose   • cyanocobalamin (VITAMIN B-12) injection 1,000 mcg  1,000 mcg Intramuscular Q30 DAYS Miquel Donald M.D.   1,000 mcg at 11/06/17 1358     Family History   Problem Relation Age of Onset   • Diabetes Daughter    • Cancer Sister      Past Medical History:   Diagnosis Date   • Arthritis    • Breast cancer (CMS-HCC)    • Cancer (CMS-HCC) 2000    breast right   • Fall    • High cholesterol    • Hypertension    • Unspecified disorder of thyroid        Review of Systems   Musculoskeletal: Positive for myalgias.   All other systems reviewed and are negative.         Objective:     /70   Pulse 80   Temp 36.2 °C (97.2 °F)   Resp 16   Ht 1.6 m (5' 3\")   Wt 82.6 kg (182 lb)   SpO2 92%   BMI 32.24 kg/m²      Physical Exam   Constitutional: She is oriented to person, place, and time. She appears well-developed and well-nourished. No distress.   HENT:   Head: Normocephalic and atraumatic.   Right Ear: External ear normal.   Left Ear: External ear normal.   Nose: Nose normal.   Eyes: Right eye exhibits no discharge. Left eye exhibits no discharge.   Neck: Normal range of motion. Neck supple. No thyromegaly present.   Cardiovascular: Normal rate, regular rhythm and normal heart sounds.  Exam reveals no gallop and no friction rub.    No murmur heard.  Pulmonary/Chest: Effort normal and breath sounds normal. She has no wheezes. She has no rales.   Musculoskeletal: She exhibits no edema or tenderness.   Patient has difficulty with mobility and is brought in today by wheelchair.   Neurological: She is alert and oriented " to person, place, and time. She displays normal reflexes.   Skin: Skin is warm and dry. No rash noted. She is not diaphoretic.   Psychiatric: She has a normal mood and affect. Her behavior is normal. Judgment and thought content normal.   Nursing note and vitals reviewed.              Assessment/Plan:     1. Dyslipidemia  I reviewed with patient her lab work prior to going off lovastatin and how it was on lovastatin and she agrees she is going to start back on the medicine since she had no side effects on the low dose statin.  - lovastatin (MEVACOR) 20 MG Tab; Take 1 Tab by mouth every day.  Dispense: 90 Tab; Refill: 3    2. Chronic pain syndrome  I have refilled patient's medication for the next year.  - gabapentin (NEURONTIN) 400 MG Cap; Take 1 Cap by mouth 3 times a day.  Dispense: 270 Cap; Refill: 3    3. Essential hypertension  Patient's pharmacy was contacted to verify which dosage of carvedilol she is using. I have refilled her prescription for 3 months with refills. She will follow back with Dr. Donald.  - carvedilol (COREG) 6.25 MG Tab; Take 1 Tab by mouth 2 times a day, with meals.  Dispense: 180 Tab; Refill: 3

## 2018-02-28 ENCOUNTER — OFFICE VISIT (OUTPATIENT)
Dept: MEDICAL GROUP | Facility: MEDICAL CENTER | Age: 83
End: 2018-02-28
Payer: MEDICARE

## 2018-02-28 VITALS
WEIGHT: 198 LBS | BODY MASS INDEX: 35.08 KG/M2 | TEMPERATURE: 98.4 F | SYSTOLIC BLOOD PRESSURE: 112 MMHG | HEIGHT: 63 IN | OXYGEN SATURATION: 91 % | RESPIRATION RATE: 16 BRPM | HEART RATE: 90 BPM | DIASTOLIC BLOOD PRESSURE: 70 MMHG

## 2018-02-28 DIAGNOSIS — G89.29 CHRONIC RIGHT SHOULDER PAIN: ICD-10-CM

## 2018-02-28 DIAGNOSIS — N32.81 OAB (OVERACTIVE BLADDER): ICD-10-CM

## 2018-02-28 DIAGNOSIS — Z85.3 PERSONAL HISTORY OF BREAST CANCER: ICD-10-CM

## 2018-02-28 DIAGNOSIS — H40.9 GLAUCOMA, UNSPECIFIED GLAUCOMA TYPE, UNSPECIFIED LATERALITY: ICD-10-CM

## 2018-02-28 DIAGNOSIS — M25.511 CHRONIC RIGHT SHOULDER PAIN: ICD-10-CM

## 2018-02-28 DIAGNOSIS — E03.9 ACQUIRED HYPOTHYROIDISM: ICD-10-CM

## 2018-02-28 DIAGNOSIS — E78.5 DYSLIPIDEMIA: ICD-10-CM

## 2018-02-28 DIAGNOSIS — R73.02 GLUCOSE INTOLERANCE (IMPAIRED GLUCOSE TOLERANCE): ICD-10-CM

## 2018-02-28 DIAGNOSIS — R53.2 FUNCTIONAL QUADRIPLEGIA (HCC): ICD-10-CM

## 2018-02-28 DIAGNOSIS — E66.9 OBESITY (BMI 30.0-34.9): ICD-10-CM

## 2018-02-28 DIAGNOSIS — G62.9 NEUROPATHY: ICD-10-CM

## 2018-02-28 DIAGNOSIS — I10 ESSENTIAL HYPERTENSION: ICD-10-CM

## 2018-02-28 DIAGNOSIS — I89.0 LYMPHEDEMA: ICD-10-CM

## 2018-02-28 DIAGNOSIS — Z00.00 MEDICARE ANNUAL WELLNESS VISIT, SUBSEQUENT: ICD-10-CM

## 2018-02-28 PROBLEM — Z71.89 DNR (DO NOT RESUSCITATE) DISCUSSION: Status: RESOLVED | Noted: 2017-04-25 | Resolved: 2018-02-28

## 2018-02-28 PROCEDURE — 99214 OFFICE O/P EST MOD 30 MIN: CPT | Mod: 25 | Performed by: INTERNAL MEDICINE

## 2018-02-28 PROCEDURE — G0439 PPPS, SUBSEQ VISIT: HCPCS | Mod: 25 | Performed by: INTERNAL MEDICINE

## 2018-02-28 ASSESSMENT — ACTIVITIES OF DAILY LIVING (ADL): BATHING_REQUIRES_ASSISTANCE: 1

## 2018-02-28 ASSESSMENT — PATIENT HEALTH QUESTIONNAIRE - PHQ9: CLINICAL INTERPRETATION OF PHQ2 SCORE: 0

## 2018-03-01 NOTE — PROGRESS NOTES
CC: Follow-up cholesterol, thyroid, shoulder pain.    HPI:   Bing presents today with the following.  Ronald describes their overall health as good.    1. Medicare annual wellness visit, subsequent  Screenings performed below advanced rectus already on file.    2. Acquired hypothyroidism  She is compliant with medication denying any hair or skin changes, due for recheck blood work in 6 months.    3. Dyslipidemia  Recently placed on a statin for elevated cholesterol. Tolerating well without myalgias.    4. Chronic right shoulder pain  She does have chronic deformities from her right hand that is not rheumatoid in nature. She subsequently uses her right arm last and has some weakness as well as decreased range of motion. Pain is 4 out of 10 in intensity.        Information for advance directives given to patient or instructed to bring in advance directives into to office to put in chart.      Depression Screening    Little interest or pleasure in doing things?  0 - not at all  Feeling down, depressed , or hopeless? 0 - not at all  Patient Health Questionnaire Score: 0     If depressive symptoms identified deferred to follow up visit unless specifically addressed in assessment and plan.    Interpretation of PHQ-9 Total Score   Score Severity   1-4 No Depression   5-9 Mild Depression   10-14 Moderate Depression   15-19 Moderately Severe Depression   20-27 Severe Depression    Screening for Cognitive Impairment    Three Minute Recall (apple, watch, last)  2/3    Draw clock face with all 12 numbers set to the hand to show 10 minutes past 11 o'clock       Cognitive concerns identified deferred for follow up unless specifically addressed in assessment and plan.    Fall Risk Assessment    Has the patient had two or more falls in the last year or any fall with injury in the last year?  No    Safety Assessment    Throw rugs on floor.  Yes  Handrails on all stairs.  Yes  Good lighting in all hallways.  Yes  Difficulty  hearing.  No  Patient counseled about all safety risks that were identified.    Functional Assessment ADLs    Are there any barriers preventing you from cooking for yourself or meeting nutritional needs?  Yes.    Are there any barriers preventing you from driving safely or obtaining transportation?  Yes.    Are there any barriers preventing you from using a telephone or calling for help?  No.    Are there any barriers preventing you from shopping?  No.    Are there any barriers preventing you from taking care of your own finances?  Yes.    Are there any barriers preventing you from managing your medications?  Yes.    Are there any barriers preventing you from showering/bathing yourself?  Yes.    Are currently engaging any exercise or physical activity?  Yes.       Health Maintenance Summary                Annual Wellness Visit Overdue 3/3/1930     IMM DTaP/Tdap/Td Vaccine Next Due 3/31/2026      Done 3/31/2016 Imm Admin: Tdap Vaccine          Patient Care Team:  Miquel Donald M.D. as PCP - General (Internal Medicine)  [facility] as Home Health Provider            Health Care Screening: Age-appropriate preventive services that Medicare covers were discussed today and ordered as indicated and agreed upon by the patient, and as recommended by USPTF and ACIP.     Patient Active Problem List    Diagnosis Date Noted   • Glaucoma 03/15/2015     Priority: High   • Personal history of breast cancer 03/15/2015     Priority: High   • Dysphagia 11/18/2016     Priority: Medium   • CKD (chronic kidney disease), stage III 09/08/2016     Priority: Medium   • OAB (overactive bladder) 09/15/2015     Priority: Medium   • Macrocytosis 03/15/2015     Priority: Medium   • Glucose intolerance (impaired glucose tolerance) 12/05/2013     Priority: Medium   • Chronic pain 04/18/2017     Priority: Low   • Acquired hypothyroidism 09/08/2016     Priority: Low   • Lymphedema 08/19/2014     Priority: Low     Class: Chronic   • Obesity  (BMI 30.0-34.9) 12/05/2013     Priority: Low   • Dyslipidemia 12/05/2013     Priority: Low   • Essential hypertension 06/11/2017   • Hyperparathyroidism (CMS-HCC) 06/08/2017   • RA (rheumatoid arthritis) (CMS-HCC) 06/07/2017   • Vitamin D deficiency 05/22/2017   • Osteopenia 05/22/2017   • Functional quadriplegia (CMS-HCC) 04/27/2017   • B12 deficiency 04/25/2017   • Insomnia 01/20/2017   • Myopathy 10/21/2016   • Neuropathy (CMS-HCC) 10/03/2016   • Osteoarthritis, multiple sites 06/04/2015   • Lumbar disc disease 05/07/2015       Current Outpatient Prescriptions   Medication Sig Dispense Refill   • gabapentin (NEURONTIN) 400 MG Cap Take 1 Cap by mouth 3 times a day. 270 Cap 3   • lovastatin (MEVACOR) 20 MG Tab Take 1 Tab by mouth every day. 90 Tab 3   • carvedilol (COREG) 6.25 MG Tab Take 1 Tab by mouth 2 times a day, with meals. 180 Tab 3   • cyanocobalamin (VITAMIN B-12) 1000 MCG/ML Solution 1 mL by Intramuscular route every 30 days. With syringes 3 Vial 3   • latanoprost (XALATAN) 0.005 % Solution Place 1 Drop in both eyes every evening.     • levothyroxine (SYNTHROID) 88 MCG Tab Take 1 Tab by mouth Every morning on an empty stomach. 90 Tab 3   • cyanocobalamin (VITAMIN B-12) 1000 MCG/ML Solution 1 mL by Intramuscular route every 30 days.  0   • vitamin D 2000 UNIT Tab Take 1 Tab by mouth every day. 60 Tab      Current Facility-Administered Medications   Medication Dose Route Frequency Provider Last Rate Last Dose   • cyanocobalamin (VITAMIN B-12) injection 1,000 mcg  1,000 mcg Intramuscular Q30 DAYS Miquel Donald M.D.   1,000 mcg at 11/06/17 5858       Family History   Problem Relation Age of Onset   • Diabetes Daughter    • Cancer Sister        Social History     Social History   • Marital status:      Spouse name: N/A   • Number of children: N/A   • Years of education: N/A     Occupational History   • Not on file.     Social History Main Topics   • Smoking status: Never Smoker   • Smokeless tobacco:  Never Used   • Alcohol use No   • Drug use: No   • Sexual activity: Not on file     Other Topics Concern   • Not on file     Social History Narrative   • No narrative on file       Past Surgical History:   Procedure Laterality Date   • ANKLE ORIF Right 3/31/2016    Procedure: ANKLE ORIF;  Surgeon: Lalo Jarquin M.D.;  Location: SURGERY [facility];  Service:    • IRRIGATION & DEBRIDEMENT ORTHO Right 3/31/2016    Procedure: IRRIGATION & DEBRIDEMENT ORTHO;  Surgeon: Lalo Jarquin M.D.;  Location: SURGERY [facility];  Service:    • KNEE REVISION TOTAL Right 2/26/2016    Procedure: KNEE REVISION TOTAL;  Surgeon: Mk Arias M.D.;  Location: SURGERY [facility];  Service:    • BONE ASPIRATION BIOPSY Right 2/23/2016    Procedure: BONE ASPIRATION BIOPSY- Knee ;  Surgeon: Mk Arias M.D.;  Location: SURGERY [facility];  Service:    • INJ,EPIDURAL/LUMB/SAC SINGLE  5/21/2013    Performed by Madison De La Garza M.D. at SURGERY [facility]   • INJ,EPIDURAL/LUMB/SAC SINGLE  8/28/2012    Performed by MADISON DE LA GARZA at [facility] ORS   • COLONOSCOPY  5/24/2012    Performed by SARAVANAN MOONEY at SURGERY [facility] ORS   • COLONOSCOPY  11/23/2011    Performed by SARAVANAN MOONEY at SURGERY [facility] ORS   • COLONOSCOPY  7/6/2011    Performed by SARAVANAN MOONEY at SURGERY SAME DAY [facility] ORS   • COLONOSCOPY  9/1/2010    Performed by SARAVANAN MOONEY at SURGERY SAME DAY [facility] ORS   • CARPAL TUNNEL RELEASE  7/30/2010    Performed by GENNA FAJARDO at SURGERY SAME DAY [facility] ORS   • GUYONS TUNNEL RELEASE  7/30/2010    Performed by GENNA FAJARDO at SURGERY SAME DAY [facility] ORS   • ABDOMINAL HYSTERECTOMY TOTAL     • CATARACT EXTRACTION WITH IOL      bilat   • HAMMERTOE CORRECTION  right   • MAMMOPLASTY REDUCTION     • MASTECTOMY  right   • OTHER ORTHOPEDIC SURGERY      r ankle   • OTHER ORTHOPEDIC SURGERY      rt knee x3   • OTHER ORTHOPEDIC SURGERY      lt  "knee x5   • PB APPENDECTOMY     • PB RADIATION THERAPY PLAN SIMPLE     • PB TOTAL KNEE ARTHROPLASTY      rakesh each twice   • SD CHEMOTHERAPY, UNSPECIFIED PROCEDURE     • TONSILLECTOMY AND ADENOIDECTOMY         Allergies as of 02/28/2018 - Reviewed 02/28/2018   Allergen Reaction Noted   • Alcohol Shortness of Breath 07/27/2010   • Aspirin Rash 07/01/2011   • Tape Contact Dermatitis 07/27/2010        ROS: Denies Chest pain, SOB, LE edema.    /70   Pulse 90   Temp 36.9 °C (98.4 °F)   Resp 16   Ht 1.6 m (5' 3\")   Wt 89.8 kg (198 lb)   SpO2 91%   BMI 35.07 kg/m²      Physical Exam:  Gen:         Alert and oriented, No apparent distress.  Neck:        No Lymphadenopathy or Bruits.  Lungs:     Clear to auscultation bilaterally  CV:          Regular rate and rhythm. No murmurs, rubs or gallops.  Abd:         Soft non tender, non distended. Normal active bowel sounds.  No  Hepatosplenomegaly, No pulsatile masses.                   Ext:          No clubbing, cyanosis, edema.      Assessment and Plan.   87 y.o. female with the following issues.    1. Medicare annual wellness visit, subsequent  Discussed healthy lifestyle habits as well as screening regimens.  - Annual Wellness Visit - Includes PPPS Subsequent ()    2. Acquired hypothyroidism  Clinically doing well recheck thyroid 6 months  - TSH; Future    3. Dyslipidemia  Continue statin without side effects currently recheck blood work 6 months  - COMP METABOLIC PANEL; Future  - LIPID PROFILE; Future    4. Chronic right shoulder pain  On exam decreased range of motion likely some rotator cuff tears. Pain is not serious enough for her to want to get injections or pursue further workup.    5. Glaucoma, unspecified glaucoma type, unspecified laterality  Follow along with ophthalmologist she will contact the office with her name.  - Annual Wellness Visit - Includes PPPS Subsequent ()    6. OAB (overactive bladder)  Symptoms stable without any discussion of " worsening  - Annual Wellness Visit - Includes PPPS Subsequent ()    7. Glucose intolerance (impaired glucose tolerance)  Blood sugar slightly elevated the past reasonable range at last check no signs of diabetes.  - Annual Wellness Visit - Includes PPPS Subsequent ()    8. Lymphedema  Leg swelling is at baseline weight is up slightly typically not getting weight office.   - Annual Wellness Visit - Includes PPPS Subsequent ()    9. Essential hypertension  Currently well controlled, Discuss diet, exercise and salt restriction.  No change to medication therapy.   - Annual Wellness Visit - Includes PPPS Subsequent ()    10. Functional quadriplegia (CMS-HCC)  She is able to transfer but not able to and late on her own.  - Annual Wellness Visit - Includes PPPS Subsequent ()    11. Neuropathy (CMS-HCC)  Pain controlled on gabapentin denies any excessive sedation  - Annual Wellness Visit - Includes PPPS Subsequent ()    12. Personal history of breast cancer  Denies any recurrence.  - Annual Wellness Visit - Includes PPPS Subsequent ()    13. Obesity (BMI 30.0-34.9)  Not morbid in nature did discuss diet and weight loss.  - Annual Wellness Visit - Includes PPPS Subsequent ()        Referrals offered: Community-based lifestyle interventions to reduce health risks and promote self-management and wellness, fall prevention, nutrition, physical activity, tobacco-use cessation, weight loss, and mental health services as per orders if indicated.    Discussion today about general wellness and lifestyle habits:    · Prevent falls and reduce trip hazards; Cautioned about securing or removing rugs.  · Have a working fire alarm and carbon monoxide detector;   · Engage in regular physical activity and social activities

## 2018-05-01 ENCOUNTER — APPOINTMENT (OUTPATIENT)
Dept: RADIOLOGY | Facility: MEDICAL CENTER | Age: 83
DRG: 871 | End: 2018-05-01
Attending: EMERGENCY MEDICINE
Payer: MEDICARE

## 2018-05-01 ENCOUNTER — HOSPITAL ENCOUNTER (INPATIENT)
Facility: MEDICAL CENTER | Age: 83
LOS: 7 days | DRG: 871 | End: 2018-05-08
Attending: EMERGENCY MEDICINE | Admitting: INTERNAL MEDICINE
Payer: MEDICARE

## 2018-05-01 DIAGNOSIS — J10.1 INFLUENZA A: ICD-10-CM

## 2018-05-01 DIAGNOSIS — N39.0 URINARY TRACT INFECTION WITHOUT HEMATURIA, SITE UNSPECIFIED: ICD-10-CM

## 2018-05-01 DIAGNOSIS — E53.8 B12 DEFICIENCY: ICD-10-CM

## 2018-05-01 DIAGNOSIS — R41.0 DELIRIUM: ICD-10-CM

## 2018-05-01 DIAGNOSIS — G93.41 ACUTE METABOLIC ENCEPHALOPATHY: ICD-10-CM

## 2018-05-01 DIAGNOSIS — A41.9 SEPSIS, DUE TO UNSPECIFIED ORGANISM: ICD-10-CM

## 2018-05-01 PROBLEM — J96.01 ACUTE RESPIRATORY FAILURE WITH HYPOXIA (HCC): Status: ACTIVE | Noted: 2018-05-01

## 2018-05-01 PROBLEM — R65.20 SEVERE SEPSIS (HCC): Status: ACTIVE | Noted: 2018-05-01

## 2018-05-01 LAB
ALBUMIN SERPL BCP-MCNC: 3.6 G/DL (ref 3.2–4.9)
ALBUMIN/GLOB SERPL: 1.1 G/DL
ALP SERPL-CCNC: 41 U/L (ref 30–99)
ALT SERPL-CCNC: 6 U/L (ref 2–50)
AMORPH CRY #/AREA URNS HPF: PRESENT /HPF
ANION GAP SERPL CALC-SCNC: 11 MMOL/L (ref 0–11.9)
APPEARANCE UR: CLEAR
AST SERPL-CCNC: 21 U/L (ref 12–45)
BACTERIA #/AREA URNS HPF: ABNORMAL /HPF
BASOPHILS # BLD AUTO: 0.5 % (ref 0–1.8)
BASOPHILS # BLD: 0.07 K/UL (ref 0–0.12)
BILIRUB SERPL-MCNC: 0.6 MG/DL (ref 0.1–1.5)
BILIRUB UR QL STRIP.AUTO: NEGATIVE
BUN SERPL-MCNC: 17 MG/DL (ref 8–22)
CALCIUM SERPL-MCNC: 8.8 MG/DL (ref 8.5–10.5)
CHLORIDE SERPL-SCNC: 100 MMOL/L (ref 96–112)
CO2 SERPL-SCNC: 22 MMOL/L (ref 20–33)
COLOR UR: YELLOW
CREAT SERPL-MCNC: 0.67 MG/DL (ref 0.5–1.4)
EKG IMPRESSION: NORMAL
EOSINOPHIL # BLD AUTO: 0 K/UL (ref 0–0.51)
EOSINOPHIL NFR BLD: 0 % (ref 0–6.9)
EPI CELLS #/AREA URNS HPF: ABNORMAL /HPF
ERYTHROCYTE [DISTWIDTH] IN BLOOD BY AUTOMATED COUNT: 45.7 FL (ref 35.9–50)
FLUAV RNA SPEC QL NAA+PROBE: POSITIVE
FLUBV RNA SPEC QL NAA+PROBE: NEGATIVE
GLOBULIN SER CALC-MCNC: 3.2 G/DL (ref 1.9–3.5)
GLUCOSE SERPL-MCNC: 135 MG/DL (ref 65–99)
GLUCOSE UR STRIP.AUTO-MCNC: NEGATIVE MG/DL
HCT VFR BLD AUTO: 44.8 % (ref 37–47)
HGB BLD-MCNC: 14.9 G/DL (ref 12–16)
IMM GRANULOCYTES # BLD AUTO: 0.08 K/UL (ref 0–0.11)
IMM GRANULOCYTES NFR BLD AUTO: 0.5 % (ref 0–0.9)
KETONES UR STRIP.AUTO-MCNC: 15 MG/DL
LACTATE BLD-SCNC: 1.2 MMOL/L (ref 0.5–2)
LEUKOCYTE ESTERASE UR QL STRIP.AUTO: ABNORMAL
LYMPHOCYTES # BLD AUTO: 2.92 K/UL (ref 1–4.8)
LYMPHOCYTES NFR BLD: 19.5 % (ref 22–41)
MCH RBC QN AUTO: 31.2 PG (ref 27–33)
MCHC RBC AUTO-ENTMCNC: 33.3 G/DL (ref 33.6–35)
MCV RBC AUTO: 93.9 FL (ref 81.4–97.8)
MICRO URNS: ABNORMAL
MONOCYTES # BLD AUTO: 0.95 K/UL (ref 0–0.85)
MONOCYTES NFR BLD AUTO: 6.3 % (ref 0–13.4)
NEUTROPHILS # BLD AUTO: 10.96 K/UL (ref 2–7.15)
NEUTROPHILS NFR BLD: 73.2 % (ref 44–72)
NITRITE UR QL STRIP.AUTO: POSITIVE
NRBC # BLD AUTO: 0 K/UL
NRBC BLD-RTO: 0 /100 WBC
PH UR STRIP.AUTO: 6 [PH]
PLATELET # BLD AUTO: 181 K/UL (ref 164–446)
PMV BLD AUTO: 9.5 FL (ref 9–12.9)
POTASSIUM SERPL-SCNC: 3.7 MMOL/L (ref 3.6–5.5)
PROT SERPL-MCNC: 6.8 G/DL (ref 6–8.2)
PROT UR QL STRIP: 100 MG/DL
RBC # BLD AUTO: 4.77 M/UL (ref 4.2–5.4)
RBC # URNS HPF: ABNORMAL /HPF
RBC UR QL AUTO: ABNORMAL
SODIUM SERPL-SCNC: 133 MMOL/L (ref 135–145)
SP GR UR STRIP.AUTO: 1.02
UROBILINOGEN UR STRIP.AUTO-MCNC: 0.2 MG/DL
WBC # BLD AUTO: 15 K/UL (ref 4.8–10.8)
WBC #/AREA URNS HPF: ABNORMAL /HPF

## 2018-05-01 PROCEDURE — 85025 COMPLETE CBC W/AUTO DIFF WBC: CPT

## 2018-05-01 PROCEDURE — 770020 HCHG ROOM/CARE - TELE (206)

## 2018-05-01 PROCEDURE — 700102 HCHG RX REV CODE 250 W/ 637 OVERRIDE(OP): Performed by: EMERGENCY MEDICINE

## 2018-05-01 PROCEDURE — 700102 HCHG RX REV CODE 250 W/ 637 OVERRIDE(OP): Performed by: INTERNAL MEDICINE

## 2018-05-01 PROCEDURE — 87040 BLOOD CULTURE FOR BACTERIA: CPT | Mod: 91

## 2018-05-01 PROCEDURE — 99223 1ST HOSP IP/OBS HIGH 75: CPT | Mod: AI | Performed by: INTERNAL MEDICINE

## 2018-05-01 PROCEDURE — A9270 NON-COVERED ITEM OR SERVICE: HCPCS | Performed by: INTERNAL MEDICINE

## 2018-05-01 PROCEDURE — 700111 HCHG RX REV CODE 636 W/ 250 OVERRIDE (IP): Performed by: EMERGENCY MEDICINE

## 2018-05-01 PROCEDURE — 304561 HCHG STAT O2

## 2018-05-01 PROCEDURE — 99285 EMERGENCY DEPT VISIT HI MDM: CPT

## 2018-05-01 PROCEDURE — 71045 X-RAY EXAM CHEST 1 VIEW: CPT

## 2018-05-01 PROCEDURE — 36415 COLL VENOUS BLD VENIPUNCTURE: CPT

## 2018-05-01 PROCEDURE — 96374 THER/PROPH/DIAG INJ IV PUSH: CPT

## 2018-05-01 PROCEDURE — 83605 ASSAY OF LACTIC ACID: CPT

## 2018-05-01 PROCEDURE — 700105 HCHG RX REV CODE 258: Performed by: INTERNAL MEDICINE

## 2018-05-01 PROCEDURE — 81001 URINALYSIS AUTO W/SCOPE: CPT

## 2018-05-01 PROCEDURE — 700111 HCHG RX REV CODE 636 W/ 250 OVERRIDE (IP): Performed by: INTERNAL MEDICINE

## 2018-05-01 PROCEDURE — 87086 URINE CULTURE/COLONY COUNT: CPT

## 2018-05-01 PROCEDURE — A9270 NON-COVERED ITEM OR SERVICE: HCPCS | Performed by: EMERGENCY MEDICINE

## 2018-05-01 PROCEDURE — 80053 COMPREHEN METABOLIC PANEL: CPT

## 2018-05-01 PROCEDURE — 87502 INFLUENZA DNA AMP PROBE: CPT

## 2018-05-01 PROCEDURE — 93005 ELECTROCARDIOGRAM TRACING: CPT | Performed by: EMERGENCY MEDICINE

## 2018-05-01 RX ORDER — AMOXICILLIN 250 MG
2 CAPSULE ORAL 2 TIMES DAILY
Status: DISCONTINUED | OUTPATIENT
Start: 2018-05-01 | End: 2018-05-08 | Stop reason: HOSPADM

## 2018-05-01 RX ORDER — SODIUM CHLORIDE 9 MG/ML
500 INJECTION, SOLUTION INTRAVENOUS
Status: DISCONTINUED | OUTPATIENT
Start: 2018-05-01 | End: 2018-05-08 | Stop reason: HOSPADM

## 2018-05-01 RX ORDER — CYANOCOBALAMIN 1000 UG/ML
1000 INJECTION, SOLUTION INTRAMUSCULAR; SUBCUTANEOUS
Status: DISCONTINUED | OUTPATIENT
Start: 2018-05-06 | End: 2018-05-08 | Stop reason: HOSPADM

## 2018-05-01 RX ORDER — BISACODYL 10 MG
10 SUPPOSITORY, RECTAL RECTAL
Status: DISCONTINUED | OUTPATIENT
Start: 2018-05-01 | End: 2018-05-08 | Stop reason: HOSPADM

## 2018-05-01 RX ORDER — OSELTAMIVIR PHOSPHATE 75 MG/1
75 CAPSULE ORAL ONCE
Status: COMPLETED | OUTPATIENT
Start: 2018-05-01 | End: 2018-05-01

## 2018-05-01 RX ORDER — LOVASTATIN 20 MG/1
20 TABLET ORAL DAILY
Status: DISCONTINUED | OUTPATIENT
Start: 2018-05-01 | End: 2018-05-08 | Stop reason: HOSPADM

## 2018-05-01 RX ORDER — CARVEDILOL 6.25 MG/1
6.25 TABLET ORAL 2 TIMES DAILY WITH MEALS
Status: DISCONTINUED | OUTPATIENT
Start: 2018-05-01 | End: 2018-05-08 | Stop reason: HOSPADM

## 2018-05-01 RX ORDER — CEFTRIAXONE 2 G/1
2 INJECTION, POWDER, FOR SOLUTION INTRAMUSCULAR; INTRAVENOUS ONCE
Status: COMPLETED | OUTPATIENT
Start: 2018-05-01 | End: 2018-05-01

## 2018-05-01 RX ORDER — ACETAMINOPHEN 325 MG/1
650 TABLET ORAL EVERY 6 HOURS PRN
Status: DISCONTINUED | OUTPATIENT
Start: 2018-05-01 | End: 2018-05-08 | Stop reason: HOSPADM

## 2018-05-01 RX ORDER — LATANOPROST 50 UG/ML
1 SOLUTION/ DROPS OPHTHALMIC NIGHTLY
Status: DISCONTINUED | OUTPATIENT
Start: 2018-05-01 | End: 2018-05-08 | Stop reason: HOSPADM

## 2018-05-01 RX ORDER — POLYETHYLENE GLYCOL 3350 17 G/17G
1 POWDER, FOR SOLUTION ORAL
Status: DISCONTINUED | OUTPATIENT
Start: 2018-05-01 | End: 2018-05-08 | Stop reason: HOSPADM

## 2018-05-01 RX ORDER — HYDRALAZINE HYDROCHLORIDE 20 MG/ML
10 INJECTION INTRAMUSCULAR; INTRAVENOUS EVERY 6 HOURS PRN
Status: DISCONTINUED | OUTPATIENT
Start: 2018-05-01 | End: 2018-05-08 | Stop reason: HOSPADM

## 2018-05-01 RX ORDER — LEVOTHYROXINE SODIUM 88 UG/1
88 TABLET ORAL
Status: DISCONTINUED | OUTPATIENT
Start: 2018-05-01 | End: 2018-05-08 | Stop reason: HOSPADM

## 2018-05-01 RX ORDER — SODIUM CHLORIDE 9 MG/ML
INJECTION, SOLUTION INTRAVENOUS ONCE
Status: COMPLETED | OUTPATIENT
Start: 2018-05-01 | End: 2018-05-01

## 2018-05-01 RX ADMIN — ENOXAPARIN SODIUM 40 MG: 100 INJECTION SUBCUTANEOUS at 18:40

## 2018-05-01 RX ADMIN — CARVEDILOL 6.25 MG: 6.25 TABLET, FILM COATED ORAL at 18:39

## 2018-05-01 RX ADMIN — OSELTAMIVIR PHOSPHATE 75 MG: 75 CAPSULE ORAL at 21:24

## 2018-05-01 RX ADMIN — SODIUM CHLORIDE: 9 INJECTION, SOLUTION INTRAVENOUS at 18:47

## 2018-05-01 RX ADMIN — LATANOPROST 1 DROP: 50 SOLUTION OPHTHALMIC at 21:25

## 2018-05-01 RX ADMIN — CEFTRIAXONE SODIUM 2 G: 2 INJECTION, POWDER, FOR SOLUTION INTRAMUSCULAR; INTRAVENOUS at 14:27

## 2018-05-01 RX ADMIN — LOVASTATIN 20 MG: 20 TABLET ORAL at 18:40

## 2018-05-01 RX ADMIN — ACETAMINOPHEN 650 MG: 325 TABLET, FILM COATED ORAL at 18:40

## 2018-05-01 ASSESSMENT — PAIN SCALES - GENERAL
PAINLEVEL_OUTOF10: 0
PAINLEVEL_OUTOF10: 0
PAINLEVEL_OUTOF10: ASSUMED PAIN PRESENT
PAINLEVEL_OUTOF10: 0

## 2018-05-01 ASSESSMENT — PATIENT HEALTH QUESTIONNAIRE - PHQ9
2. FEELING DOWN, DEPRESSED, IRRITABLE, OR HOPELESS: NOT AT ALL
SUM OF ALL RESPONSES TO PHQ9 QUESTIONS 1 AND 2: 0
1. LITTLE INTEREST OR PLEASURE IN DOING THINGS: NOT AT ALL

## 2018-05-01 ASSESSMENT — LIFESTYLE VARIABLES
EVER_SMOKED: NEVER
DO YOU DRINK ALCOHOL: NO

## 2018-05-01 NOTE — ED PROVIDER NOTES
ED Provider Note    CHIEF COMPLAINT  Chief Complaint   Patient presents with   • Weakness       HPI  Bing Benral is a 88 y.o. female who presents for evaluation of altered mentation and generalized weakness over the past couple of days, baseline the patient is highly functioning and mentally clear, her caretaker the bedside reports that for the past couple days she has been obviously confused, doesn't communicate very well and has been having difficulty caring for self. His been no vomiting, temperature is high as 100 at home. History of UTI. Her  recently sick with some respiratory illness. There is been intermittent complaints of abdominal pain. No rash. Given the patient's clinical condition no other history is available at this time.    REVIEW OF SYSTEMS  Unobtainable secondary to clinical condition    PAST MEDICAL HISTORY  Past Medical History:   Diagnosis Date   • Arthritis    • Breast cancer (HCC)    • Cancer (HCC) 2000    breast right   • Fall    • High cholesterol    • Hypertension    • Unspecified disorder of thyroid        FAMILY HISTORY  Family History   Problem Relation Age of Onset   • Diabetes Daughter    • Cancer Sister        SOCIAL HISTORY  Social History   Substance Use Topics   • Smoking status: Never Smoker   • Smokeless tobacco: Never Used   • Alcohol use No       SURGICAL HISTORY  Past Surgical History:   Procedure Laterality Date   • ANKLE ORIF Right 3/31/2016    Procedure: ANKLE ORIF;  Surgeon: Lalo Jarquin M.D.;  Location: SURGERY [facility];  Service:    • IRRIGATION & DEBRIDEMENT ORTHO Right 3/31/2016    Procedure: IRRIGATION & DEBRIDEMENT ORTHO;  Surgeon: Lalo Jarquin M.D.;  Location: [facility];  Service:    • KNEE REVISION TOTAL Right 2/26/2016    Procedure: KNEE REVISION TOTAL;  Surgeon: Mk Arias M.D.;  Location: SURGERY [facility];  Service:    • BONE ASPIRATION BIOPSY Right 2/23/2016    Procedure: BONE ASPIRATION BIOPSY-  "Knee ;  Surgeon: Mk Arias M.D.;  Location: SURGERY [facility];  Service:    • INJ,EPIDURAL/LUMB/SAC SINGLE  5/21/2013    Performed by Madison De La Garza M.D. at SURGERY [facility] ORS   • INJ,EPIDURAL/LUMB/SAC SINGLE  8/28/2012    Performed by MADISON DE LA GARZA at [facility] ORS   • COLONOSCOPY  5/24/2012    Performed by SARAVANAN MOONEY at SURGERY SAME DAY [facility] ORS   • COLONOSCOPY  11/23/2011    Performed by SARAVANAN MOONEY at SURGERY [facility] ORS   • COLONOSCOPY  7/6/2011    Performed by SARAVANAN MOONEY at SURGERY SAME DAY [facility] ORS   • COLONOSCOPY  9/1/2010    Performed by SARAVANAN MOONEY at SURGERY SAME DAY [facility] ORS   • CARPAL TUNNEL RELEASE  7/30/2010    Performed by GENNA FAJARDO at SURGERY SAME DAY [facility] ORS   • GUYONS TUNNEL RELEASE  7/30/2010    Performed by GENNA FAJARDO at SURGERY SAME DAY [facility] ORS   • ABDOMINAL HYSTERECTOMY TOTAL     • CATARACT EXTRACTION WITH IOL      bilat   • HAMMERTOE CORRECTION  right   • MAMMOPLASTY REDUCTION     • MASTECTOMY  right   • OTHER ORTHOPEDIC SURGERY      r ankle   • OTHER ORTHOPEDIC SURGERY      rt knee x3   • OTHER ORTHOPEDIC SURGERY      lt knee x5   • PB APPENDECTOMY     • PB RADIATION THERAPY PLAN SIMPLE     • PB TOTAL KNEE ARTHROPLASTY      rakesh each twice   • NM CHEMOTHERAPY, UNSPECIFIED PROCEDURE     • TONSILLECTOMY AND ADENOIDECTOMY         CURRENT MEDICATIONS  I personally reviewed the medication list in the charting documentation.     ALLERGIES  Allergies   Allergen Reactions   • Alcohol Shortness of Breath     Rubbing alcohol cause her to lose her voice   • Aspirin Rash   • Tape Contact Dermatitis     Tears skin   • Penicillins        MEDICAL RECORD  I have reviewed patient's medical record and pertinent results are listed above.      PHYSICAL EXAM  VITAL SIGNS: Pulse (!) 104   Temp (!) 38.6 °C (101.5 °F)   Resp 18   Ht 1.676 m (5' 6\")   Wt 89.8 kg (198 lb)   SpO2 88%   BMI 31.96 kg/m²  "   Constitutional: Elderly, appears mildly ill  HENT: Mucus membranes dry.    Eyes: No scleral icterus. Normal conjunctiva   Neck: Supple, comfortable, nonpainful range of motion.   Cardiovascular: Tachycardic regular   Thorax & Lungs: Chest is nontender. Good air movement, bibasilar crackles, no wheezing  Abdomen: Soft, with no tenderness, rebound nor guarding.  No mass or pulsatile mass appreciated.  Skin: Warm, dry. No rash appreciated  Extremities/Musculoskeletal: No sign of trauma. No asymmetric calf tenderness, erythema or edema. Normal range of motion   Neurologic: Alert & confused, cannot assess orientation, moves all 4 extremities with apparent equal strength but does not follow commands well enough for thorough neurologic evaluation  Psychiatric: Normal affect appropriate for the clinical situation.    DIAGNOSTIC STUDIES / PROCEDURES    EKG  12 Lead EKG interpreted by me to show:    Rate 102  Rhythm: Sinus tachycardia  Axis: Leftward  NM and QRS Intervals: Bifascicular block  T waves: No acute changes  ST segments: No acute changes  Ectopy: None.    My impression of this EKG: Does not indicate ischemia or arrythmia at this time. No significant change from prior      LABS  Results for orders placed or performed during the hospital encounter of 05/01/18   Lactic acid (lactate)   Result Value Ref Range    Lactic Acid 1.2 0.5 - 2.0 mmol/L   CBC WITH DIFFERENTIAL   Result Value Ref Range    WBC 15.0 (H) 4.8 - 10.8 K/uL    RBC 4.77 4.20 - 5.40 M/uL    Hemoglobin 14.9 12.0 - 16.0 g/dL    Hematocrit 44.8 37.0 - 47.0 %    MCV 93.9 81.4 - 97.8 fL    MCH 31.2 27.0 - 33.0 pg    MCHC 33.3 (L) 33.6 - 35.0 g/dL    RDW 45.7 35.9 - 50.0 fL    Platelet Count 181 164 - 446 K/uL    MPV 9.5 9.0 - 12.9 fL    Neutrophils-Polys 73.20 (H) 44.00 - 72.00 %    Lymphocytes 19.50 (L) 22.00 - 41.00 %    Monocytes 6.30 0.00 - 13.40 %    Eosinophils 0.00 0.00 - 6.90 %    Basophils 0.50 0.00 - 1.80 %    Immature Granulocytes 0.50 0.00 -  0.90 %    Nucleated RBC 0.00 /100 WBC    Neutrophils (Absolute) 10.96 (H) 2.00 - 7.15 K/uL    Lymphs (Absolute) 2.92 1.00 - 4.80 K/uL    Monos (Absolute) 0.95 (H) 0.00 - 0.85 K/uL    Eos (Absolute) 0.00 0.00 - 0.51 K/uL    Baso (Absolute) 0.07 0.00 - 0.12 K/uL    Immature Granulocytes (abs) 0.08 0.00 - 0.11 K/uL    NRBC (Absolute) 0.00 K/uL   COMP METABOLIC PANEL   Result Value Ref Range    Sodium 133 (L) 135 - 145 mmol/L    Potassium 3.7 3.6 - 5.5 mmol/L    Chloride 100 96 - 112 mmol/L    Co2 22 20 - 33 mmol/L    Anion Gap 11.0 0.0 - 11.9    Glucose 135 (H) 65 - 99 mg/dL    Bun 17 8 - 22 mg/dL    Creatinine 0.67 0.50 - 1.40 mg/dL    Calcium 8.8 8.5 - 10.5 mg/dL    AST(SGOT) 21 12 - 45 U/L    ALT(SGPT) 6 2 - 50 U/L    Alkaline Phosphatase 41 30 - 99 U/L    Total Bilirubin 0.6 0.1 - 1.5 mg/dL    Albumin 3.6 3.2 - 4.9 g/dL    Total Protein 6.8 6.0 - 8.2 g/dL    Globulin 3.2 1.9 - 3.5 g/dL    A-G Ratio 1.1 g/dL   URINALYSIS   Result Value Ref Range    Micro Urine Req Microscopic     Color Yellow     Character Clear     Specific Gravity 1.025 <1.035    Ph 6.0 5.0 - 8.0    Glucose Negative Negative mg/dL    Ketones 15 (A) Negative mg/dL    Protein 100 (A) Negative mg/dL    Bilirubin Negative Negative    Urobilinogen, Urine 0.2 Negative    Nitrite Positive (A) Negative    Leukocyte Esterase Small (A) Negative    Occult Blood Large (A) Negative   ESTIMATED GFR   Result Value Ref Range    GFR If African American >60 >60 mL/min/1.73 m 2    GFR If Non African American >60 >60 mL/min/1.73 m 2   URINE MICROSCOPIC (W/UA)   Result Value Ref Range    WBC Packed (A) /hpf    RBC 10-20 (A) /hpf    Bacteria Moderate (A) None /hpf    Epithelial Cells Few /hpf    Amorphous Crystal Present /hpf   INFLUENZA A/B BY PCR   Result Value Ref Range    Influenza virus A RNA POSITIVE (A) Negative    Influenza virus B, PCR Negative Negative   EKG (ER)   Result Value Ref Range    Report       [facility]    Test  Date:  2018  Pt Name:    GIOVANA GUZMÁN              Department: ER  MRN:        [id number]                      Room:        15  Gender:     Female                       Technician: [id number]  :        [id number]                   Requested By:MATHEUS PRINGLE  Order #:    [id number]                    Reading MD: MATHEUS PRINGLE MD    Measurements  Intervals                                Axis  Rate:       102                          P:          55  OR:         188                          QRS:        -78  QRSD:       150                          T:          26  QT:         380  QTc:        496    Interpretive Statements  Please refer to my dictation for my interpretation.  Electronically Signed On 2018 15:48:56 PDT by MATHEUS PRINGLE MD           RADIOLOGY     DX-CHEST-PORTABLE (1 VIEW)   Final Result      1.  Hypoinflation and elevation the RIGHT hemidiaphragm with chronic RIGHT lung base atelectasis or scar.   2.  Pulmonary vascular congestion and probable minimal pulmonary edema.      ECHOCARDIOGRAM COMP W/O CONT    (Results Pending)         COURSE & MEDICAL DECISION MAKING  I have reviewed any medical record information, laboratory studies and radiographic results as noted above.  Differential diagnoses includes: UTI, pneumonia, sepsis, dehydration, electro-lyte abnormalities    Encounter Summary: This is a 88 y.o. female with altered mentation for 2 days, presents febrile and tachycardic and ill appearing, she is also hypoxic with oxygen saturations in the 80s, she's had a cough, also history of UTI, exam otherwise does not give any indication of the source of infection, will check a regular septic workup including blood cultures and lactic acid, urinalysis, chest x-ray and basic blood work, will administer broad-spectrum antibiotics to cover both respiratory and urine source up front ------ clear-cut nitrite positive urinary tract infection as well as influenza, treated with ceftriaxone as well  as Tamiflu. Admitted to the hospital in critical condition, hemodynamically improved with an improved heart rate and hypertension     CRITICAL CARE  The very real possibilty of a deterioration of this patient's condition required the highest level of my preparedness for sudden, emergent intervention.  I provided critical care services, which included medication orders, frequent reevaluations of the patient's condition and response to treatment, ordering and reviewing test results, and discussing the case with various consultants.  The critical care time associated with the care of the patient was 39 minutes. Review chart for interventions. This time is exclusive of any other billable procedures.       DISPOSITION: Admitted in critical condition      FINAL IMPRESSION  1. Sepsis, due to unspecified organism (HCC)    2. Urinary tract infection without hematuria, site unspecified    3. Delirium    4. Influenza           This dictation was created using voice recognition software. The accuracy of the dictation is limited to the abilities of the software. I expect there may be some errors of grammar and possibly content. The nursing notes were reviewed and certain aspects of this information were incorporated into this note.    Electronically signed by: Juan Carlos Fleming, 5/1/2018 2:09 PM

## 2018-05-02 LAB
ALBUMIN SERPL BCP-MCNC: 3.6 G/DL (ref 3.2–4.9)
ALBUMIN/GLOB SERPL: 1.1 G/DL
ALP SERPL-CCNC: 38 U/L (ref 30–99)
ALT SERPL-CCNC: 7 U/L (ref 2–50)
ANION GAP SERPL CALC-SCNC: 13 MMOL/L (ref 0–11.9)
AST SERPL-CCNC: 22 U/L (ref 12–45)
BASOPHILS # BLD AUTO: 0.4 % (ref 0–1.8)
BASOPHILS # BLD: 0.06 K/UL (ref 0–0.12)
BILIRUB SERPL-MCNC: 0.5 MG/DL (ref 0.1–1.5)
BNP SERPL-MCNC: 281 PG/ML (ref 0–100)
BUN SERPL-MCNC: 21 MG/DL (ref 8–22)
CALCIUM SERPL-MCNC: 9 MG/DL (ref 8.5–10.5)
CHLORIDE SERPL-SCNC: 98 MMOL/L (ref 96–112)
CO2 SERPL-SCNC: 21 MMOL/L (ref 20–33)
CREAT SERPL-MCNC: 0.73 MG/DL (ref 0.5–1.4)
EKG IMPRESSION: NORMAL
EOSINOPHIL # BLD AUTO: 0 K/UL (ref 0–0.51)
EOSINOPHIL NFR BLD: 0 % (ref 0–6.9)
ERYTHROCYTE [DISTWIDTH] IN BLOOD BY AUTOMATED COUNT: 45.2 FL (ref 35.9–50)
GLOBULIN SER CALC-MCNC: 3.2 G/DL (ref 1.9–3.5)
GLUCOSE SERPL-MCNC: 152 MG/DL (ref 65–99)
HCT VFR BLD AUTO: 45.5 % (ref 37–47)
HGB BLD-MCNC: 15.5 G/DL (ref 12–16)
IMM GRANULOCYTES # BLD AUTO: 0.06 K/UL (ref 0–0.11)
IMM GRANULOCYTES NFR BLD AUTO: 0.4 % (ref 0–0.9)
LACTATE BLD-SCNC: 1.1 MMOL/L (ref 0.5–2)
LV EJECT FRACT  99904: 60
LV EJECT FRACT MOD 2C 99903: 61.46
LV EJECT FRACT MOD 4C 99902: 65.8
LV EJECT FRACT MOD BP 99901: 64.59
LYMPHOCYTES # BLD AUTO: 2.94 K/UL (ref 1–4.8)
LYMPHOCYTES NFR BLD: 21.8 % (ref 22–41)
MCH RBC QN AUTO: 31.6 PG (ref 27–33)
MCHC RBC AUTO-ENTMCNC: 34.1 G/DL (ref 33.6–35)
MCV RBC AUTO: 92.9 FL (ref 81.4–97.8)
MONOCYTES # BLD AUTO: 0.77 K/UL (ref 0–0.85)
MONOCYTES NFR BLD AUTO: 5.7 % (ref 0–13.4)
NEUTROPHILS # BLD AUTO: 9.67 K/UL (ref 2–7.15)
NEUTROPHILS NFR BLD: 71.7 % (ref 44–72)
NRBC # BLD AUTO: 0 K/UL
NRBC BLD-RTO: 0 /100 WBC
PLATELET # BLD AUTO: 169 K/UL (ref 164–446)
PMV BLD AUTO: 9.7 FL (ref 9–12.9)
POTASSIUM SERPL-SCNC: 3.5 MMOL/L (ref 3.6–5.5)
PROT SERPL-MCNC: 6.8 G/DL (ref 6–8.2)
RBC # BLD AUTO: 4.9 M/UL (ref 4.2–5.4)
SODIUM SERPL-SCNC: 132 MMOL/L (ref 135–145)
TSH SERPL DL<=0.005 MIU/L-ACNC: 0.65 UIU/ML (ref 0.38–5.33)
WBC # BLD AUTO: 13.5 K/UL (ref 4.8–10.8)

## 2018-05-02 PROCEDURE — 93010 ELECTROCARDIOGRAM REPORT: CPT | Performed by: INTERNAL MEDICINE

## 2018-05-02 PROCEDURE — 700105 HCHG RX REV CODE 258

## 2018-05-02 PROCEDURE — 99232 SBSQ HOSP IP/OBS MODERATE 35: CPT | Performed by: HOSPITALIST

## 2018-05-02 PROCEDURE — A9270 NON-COVERED ITEM OR SERVICE: HCPCS | Performed by: INTERNAL MEDICINE

## 2018-05-02 PROCEDURE — 770020 HCHG ROOM/CARE - TELE (206)

## 2018-05-02 PROCEDURE — 97162 PT EVAL MOD COMPLEX 30 MIN: CPT

## 2018-05-02 PROCEDURE — G8996 SWALLOW CURRENT STATUS: HCPCS | Mod: CL

## 2018-05-02 PROCEDURE — G8978 MOBILITY CURRENT STATUS: HCPCS | Mod: CM

## 2018-05-02 PROCEDURE — 92610 EVALUATE SWALLOWING FUNCTION: CPT

## 2018-05-02 PROCEDURE — G8997 SWALLOW GOAL STATUS: HCPCS | Mod: CK

## 2018-05-02 PROCEDURE — 93306 TTE W/DOPPLER COMPLETE: CPT

## 2018-05-02 PROCEDURE — 97535 SELF CARE MNGMENT TRAINING: CPT

## 2018-05-02 PROCEDURE — 84443 ASSAY THYROID STIM HORMONE: CPT

## 2018-05-02 PROCEDURE — 80053 COMPREHEN METABOLIC PANEL: CPT

## 2018-05-02 PROCEDURE — 700102 HCHG RX REV CODE 250 W/ 637 OVERRIDE(OP): Performed by: INTERNAL MEDICINE

## 2018-05-02 PROCEDURE — 700102 HCHG RX REV CODE 250 W/ 637 OVERRIDE(OP): Performed by: HOSPITALIST

## 2018-05-02 PROCEDURE — A9270 NON-COVERED ITEM OR SERVICE: HCPCS | Performed by: HOSPITALIST

## 2018-05-02 PROCEDURE — 93306 TTE W/DOPPLER COMPLETE: CPT | Mod: 26 | Performed by: INTERNAL MEDICINE

## 2018-05-02 PROCEDURE — G8987 SELF CARE CURRENT STATUS: HCPCS | Mod: CN

## 2018-05-02 PROCEDURE — 36415 COLL VENOUS BLD VENIPUNCTURE: CPT

## 2018-05-02 PROCEDURE — G8979 MOBILITY GOAL STATUS: HCPCS | Mod: CK

## 2018-05-02 PROCEDURE — 85025 COMPLETE CBC W/AUTO DIFF WBC: CPT

## 2018-05-02 PROCEDURE — 700111 HCHG RX REV CODE 636 W/ 250 OVERRIDE (IP): Performed by: INTERNAL MEDICINE

## 2018-05-02 PROCEDURE — 97166 OT EVAL MOD COMPLEX 45 MIN: CPT

## 2018-05-02 PROCEDURE — G8988 SELF CARE GOAL STATUS: HCPCS | Mod: CL

## 2018-05-02 PROCEDURE — 83605 ASSAY OF LACTIC ACID: CPT

## 2018-05-02 PROCEDURE — 83880 ASSAY OF NATRIURETIC PEPTIDE: CPT

## 2018-05-02 PROCEDURE — 93005 ELECTROCARDIOGRAM TRACING: CPT | Performed by: HOSPITALIST

## 2018-05-02 RX ORDER — SODIUM CHLORIDE 9 MG/ML
INJECTION, SOLUTION INTRAVENOUS
Status: COMPLETED
Start: 2018-05-02 | End: 2018-05-02

## 2018-05-02 RX ORDER — ONDANSETRON 2 MG/ML
4 INJECTION INTRAMUSCULAR; INTRAVENOUS EVERY 6 HOURS PRN
Status: DISCONTINUED | OUTPATIENT
Start: 2018-05-02 | End: 2018-05-08 | Stop reason: HOSPADM

## 2018-05-02 RX ORDER — ACETAMINOPHEN 650 MG/1
650 SUPPOSITORY RECTAL EVERY 6 HOURS PRN
Status: DISCONTINUED | OUTPATIENT
Start: 2018-05-02 | End: 2018-05-08 | Stop reason: HOSPADM

## 2018-05-02 RX ORDER — LABETALOL HYDROCHLORIDE 5 MG/ML
20 INJECTION, SOLUTION INTRAVENOUS ONCE
Status: COMPLETED | OUTPATIENT
Start: 2018-05-03 | End: 2018-05-03

## 2018-05-02 RX ORDER — PROMETHAZINE HYDROCHLORIDE 25 MG/1
12.5 SUPPOSITORY RECTAL ONCE
Status: COMPLETED | OUTPATIENT
Start: 2018-05-02 | End: 2018-05-02

## 2018-05-02 RX ORDER — NITROGLYCERIN 0.4 MG/1
0.4 TABLET SUBLINGUAL PRN
Status: DISCONTINUED | OUTPATIENT
Start: 2018-05-02 | End: 2018-05-08 | Stop reason: HOSPADM

## 2018-05-02 RX ADMIN — ACETAMINOPHEN 650 MG: 325 TABLET, FILM COATED ORAL at 03:59

## 2018-05-02 RX ADMIN — STANDARDIZED SENNA CONCENTRATE AND DOCUSATE SODIUM 2 TABLET: 8.6; 5 TABLET, FILM COATED ORAL at 05:48

## 2018-05-02 RX ADMIN — PROMETHAZINE HYDROCHLORIDE 12.5 MG: 25 SUPPOSITORY RECTAL at 10:40

## 2018-05-02 RX ADMIN — HYDRALAZINE HYDROCHLORIDE 10 MG: 20 INJECTION INTRAMUSCULAR; INTRAVENOUS at 19:25

## 2018-05-02 RX ADMIN — CEFTRIAXONE SODIUM 1 G: 10 INJECTION, POWDER, FOR SOLUTION INTRAVENOUS at 08:01

## 2018-05-02 RX ADMIN — SODIUM CHLORIDE 500 ML: 9 INJECTION, SOLUTION INTRAVENOUS at 05:00

## 2018-05-02 RX ADMIN — LATANOPROST 1 DROP: 50 SOLUTION OPHTHALMIC at 21:44

## 2018-05-02 RX ADMIN — ENOXAPARIN SODIUM 40 MG: 100 INJECTION SUBCUTANEOUS at 05:48

## 2018-05-02 RX ADMIN — LEVOTHYROXINE SODIUM 88 MCG: 88 TABLET ORAL at 05:48

## 2018-05-02 RX ADMIN — CARVEDILOL 6.25 MG: 6.25 TABLET, FILM COATED ORAL at 07:38

## 2018-05-02 RX ADMIN — LOVASTATIN 20 MG: 20 TABLET ORAL at 05:48

## 2018-05-02 ASSESSMENT — ENCOUNTER SYMPTOMS
CHILLS: 1
PHOTOPHOBIA: 0
MYALGIAS: 0
WHEEZING: 0
DOUBLE VISION: 0
DIAPHORESIS: 1
FEVER: 1
COUGH: 1
MEMORY LOSS: 1
PALPITATIONS: 0
HEMOPTYSIS: 0
SHORTNESS OF BREATH: 1
ORTHOPNEA: 0
BLURRED VISION: 0
CLAUDICATION: 0
NECK PAIN: 0

## 2018-05-02 ASSESSMENT — COGNITIVE AND FUNCTIONAL STATUS - GENERAL
MOVING TO AND FROM BED TO CHAIR: A LOT
TOILETING: TOTAL
PERSONAL GROOMING: TOTAL
WALKING IN HOSPITAL ROOM: TOTAL
SUGGESTED CMS G CODE MODIFIER MOBILITY: CM
STANDING UP FROM CHAIR USING ARMS: A LOT
DRESSING REGULAR UPPER BODY CLOTHING: TOTAL
SUGGESTED CMS G CODE MODIFIER DAILY ACTIVITY: CN
EATING MEALS: TOTAL
DRESSING REGULAR LOWER BODY CLOTHING: TOTAL
HELP NEEDED FOR BATHING: TOTAL
DAILY ACTIVITIY SCORE: 6
MOBILITY SCORE: 9
CLIMB 3 TO 5 STEPS WITH RAILING: TOTAL
MOVING FROM LYING ON BACK TO SITTING ON SIDE OF FLAT BED: UNABLE
TURNING FROM BACK TO SIDE WHILE IN FLAT BAD: A LOT

## 2018-05-02 ASSESSMENT — PAIN SCALES - GENERAL
PAINLEVEL_OUTOF10: 2
PAINLEVEL_OUTOF10: 0
PAINLEVEL_OUTOF10: 3
PAINLEVEL_OUTOF10: 0
PAINLEVEL_OUTOF10: 5

## 2018-05-02 ASSESSMENT — GAIT ASSESSMENTS: GAIT LEVEL OF ASSIST: UNABLE TO PARTICIPATE

## 2018-05-02 ASSESSMENT — ACTIVITIES OF DAILY LIVING (ADL): TOILETING: REQUIRES ASSIST

## 2018-05-02 NOTE — ASSESSMENT & PLAN NOTE
This is severe sepsis with the following associated acute organ dysfunction(s): metabolic/septic encephalopathy.   She appears to have a UTI however influenza A was also positive.  -Follow-up urine culture, ceftriaxone  -Tamiflu  -Follow all cultures  -IV hydration, use caution in the setting of mild pulmonary edema

## 2018-05-02 NOTE — H&P
Hospital Medicine History and Physical    Date of Service  5/1/2018    Chief Complaint  Chief Complaint   Patient presents with   • Weakness       History of Presenting Illness  88 y.o. Female with a history of physical debility, hypertension, hyperlipidemia, hypothyroidism who presented 5/1/2018 with generalized weakness and confusion.     History is obtained by the patient's caregiver as the patient is confused and unable to provide history. The caregiver states that the patient hasn't has been ill with fever and cough for the last several days to a week. On Sunday, the caregiver noted that the patient began appearing much more fatigued and lethargic. Since then she has had low appetite and low by mouth intake. On Monday, yesterday, she began acting differently and appeared to be confused. His worsened throughout the day and continue to worsen overnight. The patient has had a cough and fever. Patient caregivers tried giving her TheraFlu and Tylenol but the fever persisted. She has not had any diarrhea and denies any pain or burning with urination. The patient denies any chest pain, she has not had any vomiting. No lower extremity swelling out of the ordinary     At baseline the patient uses a wheelchair for ambulation however she has been extremely weak even with transfers. The patient has daily caregivers who assist with ambulation. The patient's  assists her with meals and medications.     ER course: Patient was noted to be tachycardic and have a fever. She was given antibiotics for presumed UTI as her UA was dirty. She started on sepsis protocol and was given a small and normal bolus due to presence of mild pulmonary edema on x-ray. She had no evidence of pneumonia on x-ray. Rapid Influenza test was sent and found to be positive for influenza A.     Primary Care Physician  Miquel Donald M.D.    Consultants  None    Code Status  DNR    Review of Systems  Review of Systems   Unable to perform ROS:  Mental status change        Past Medical History  Past Medical History:   Diagnosis Date   • Cancer (HCC) 2000    breast right   • Arthritis    • Breast cancer (HCC)    • Fall    • High cholesterol    • Hypertension    • Unspecified disorder of thyroid        Surgical History  Past Surgical History:   Procedure Laterality Date   • ANKLE ORIF Right 3/31/2016    Procedure: ANKLE ORIF;  Surgeon: Lalo Jarquin M.D.;  Location: SURGERY [facility];  Service:    • IRRIGATION & DEBRIDEMENT ORTHO Right 3/31/2016    Procedure: IRRIGATION & DEBRIDEMENT ORTHO;  Surgeon: Lalo Jarquin M.D.;  Location: SURGERY [facility];  Service:    • KNEE REVISION TOTAL Right 2/26/2016    Procedure: KNEE REVISION TOTAL;  Surgeon: Mk Arias M.D.;  Location: SURGERY [facility];  Service:    • BONE ASPIRATION BIOPSY Right 2/23/2016    Procedure: BONE ASPIRATION BIOPSY- Knee ;  Surgeon: Mk Arias M.D.;  Location: SURGERY [facility];  Service:    • INJ,EPIDURAL/LUMB/SAC SINGLE  5/21/2013    Performed by Madison De La Garza M.D. at SURGERY [facility] ORS   • INJ,EPIDURAL/LUMB/SAC SINGLE  8/28/2012    Performed by MADISON DE LA GARZA at [facility] ORS   • COLONOSCOPY  5/24/2012    Performed by SARAVANAN MOONEY at SURGERY [facility] DAY [facility] ORS   • COLONOSCOPY  11/23/2011    Performed by SARAVANAN MOONEY at SURGERY [facility] ORS   • COLONOSCOPY  7/6/2011    Performed by SARAVANAN MOONEY at SURGERY SAME DAY [facility] ORS   • COLONOSCOPY  9/1/2010    Performed by SARAVANAN MOONEY at SURGERY SAME DAY [facility] ORS   • CARPAL TUNNEL RELEASE  7/30/2010    Performed by GENNA FAJARDO at SURGERY SAME DAY [facility] ORS   • GUYONS TUNNEL RELEASE  7/30/2010    Performed by GENNA FAJARDO at SURGERY SAME DAY [facility] ORS   • ABDOMINAL HYSTERECTOMY TOTAL     • CATARACT EXTRACTION WITH IOL      bilat   • HAMMERTOE CORRECTION  right   • MAMMOPLASTY REDUCTION     • MASTECTOMY  right   • OTHER ORTHOPEDIC SURGERY       r ankle   • OTHER ORTHOPEDIC SURGERY      rt knee x3   • OTHER ORTHOPEDIC SURGERY      lt knee x5   • PB APPENDECTOMY     • PB RADIATION THERAPY PLAN SIMPLE     • PB TOTAL KNEE ARTHROPLASTY      rakesh each twice   • UT CHEMOTHERAPY, UNSPECIFIED PROCEDURE     • TONSILLECTOMY AND ADENOIDECTOMY         Medications  No current facility-administered medications on file prior to encounter.      Current Outpatient Prescriptions on File Prior to Encounter   Medication Sig Dispense Refill   • gabapentin (NEURONTIN) 400 MG Cap Take 1 Cap by mouth 3 times a day. 270 Cap 3   • lovastatin (MEVACOR) 20 MG Tab Take 1 Tab by mouth every day. 90 Tab 3   • carvedilol (COREG) 6.25 MG Tab Take 1 Tab by mouth 2 times a day, with meals. 180 Tab 3   • cyanocobalamin (VITAMIN B-12) 1000 MCG/ML Solution 1 mL by Intramuscular route every 30 days. With syringes 3 Vial 3   • latanoprost (XALATAN) 0.005 % Solution Place 1 Drop in both eyes every evening.     • levothyroxine (SYNTHROID) 88 MCG Tab Take 1 Tab by mouth Every morning on an empty stomach. 90 Tab 3   • cyanocobalamin (VITAMIN B-12) 1000 MCG/ML Solution 1 mL by Intramuscular route every 30 days.  0   • vitamin D 2000 UNIT Tab Take 1 Tab by mouth every day. 60 Tab        Family History  Family History   Problem Relation Age of Onset   • Diabetes Daughter    • Cancer Sister        Social History  Social History   Substance Use Topics   • Smoking status: Never Smoker   • Smokeless tobacco: Never Used   • Alcohol use No       Allergies  Allergies   Allergen Reactions   • Alcohol Shortness of Breath     Rubbing alcohol cause her to lose her voice   • Aspirin Rash   • Tape Contact Dermatitis     Tears skin   • Penicillins      Tolerates ceftriaxone 18        Physical Exam  Laboratory   Hemodynamics  Temp (24hrs), Av.9 °C (100.3 °F), Min:37.3 °C (99.1 °F), Max:38.6 °C (101.5 °F)   Temperature: 38 °C (100.4 °F)  Pulse  Av.1  Min: 97  Max: 106 Heart Rate (Monitored): 100  Blood  Pressure : 146/87, NIBP: (!) 167/83      Respiratory      Respiration: 18, Pulse Oximetry: 90 %        RUL Breath Sounds: Diminished, RML Breath Sounds: Diminished, RLL Breath Sounds: Diminished, HUGH Breath Sounds: Diminished, LLL Breath Sounds: Diminished    Physical Exam   Constitutional: She appears well-developed and well-nourished. She appears lethargic. She appears distressed.   Elderly, confused   HENT:   Head: Normocephalic.   Mouth/Throat: No oropharyngeal exudate.   Eyes: Pupils are equal, round, and reactive to light. No scleral icterus.   Neck: Normal range of motion. Neck supple. No JVD present. No thyromegaly present.   Cardiovascular: Regular rhythm.  Tachycardia present.  Exam reveals no gallop.    No murmur heard.  Pulmonary/Chest: Breath sounds normal. No respiratory distress. She has no wheezes. She exhibits no tenderness.   Slightly labored breathing   Abdominal: Soft. Bowel sounds are normal. She exhibits no distension. There is no tenderness.   Neurological: She appears lethargic. She is disoriented. No cranial nerve deficit.   Skin: There is pallor.   Psychiatric: She is slowed. Cognition and memory are impaired. She expresses impulsivity and inappropriate judgment.       Recent Labs      05/01/18   1411   WBC  15.0*   RBC  4.77   HEMOGLOBIN  14.9   HEMATOCRIT  44.8   MCV  93.9   MCH  31.2   MCHC  33.3*   RDW  45.7   PLATELETCT  181   MPV  9.5     Recent Labs      05/01/18   1411   SODIUM  133*   POTASSIUM  3.7   CHLORIDE  100   CO2  22   GLUCOSE  135*   BUN  17   CREATININE  0.67   CALCIUM  8.8     Recent Labs      05/01/18   1411   ALTSGPT  6   ASTSGOT  21   ALKPHOSPHAT  41   TBILIRUBIN  0.6   GLUCOSE  135*                 Lab Results   Component Value Date    TROPONINI <0.01 09/13/2016     Urinalysis:    Lab Results  Component Value Date/Time   SPECGRAVITY 1.025 05/01/2018 1455   GLUCOSEUR Negative 05/01/2018 1455   KETONES 15 (A) 05/01/2018 1455   NITRITE Positive (A) 05/01/2018 1455    WBCURINE Packed (A) 05/01/2018 1455   RBCURINE 10-20 (A) 05/01/2018 1455   BACTERIA Moderate (A) 05/01/2018 1455   EPITHELCELL Few 05/01/2018 1455        Imaging  Dx-chest-portable (1 View)  Result Date: 5/1/2018  1.  Hypoinflation and elevation the RIGHT hemidiaphragm with chronic RIGHT lung base atelectasis or scar. 2.  Pulmonary vascular congestion and probable minimal pulmonary edema.     Assessment/Plan     I anticipate this patient will require at least two midnights for appropriate medical management, necessitating inpatient admission.    * Influenza A   Assessment & Plan    Rapid flu was positive  Contact isolation  Initiate Tamiflu        Acute respiratory failure with hypoxia (HCC)   Assessment & Plan    I suspect this is due to pulmonary edema. She has no evidence of pneumonia on x-ray.  -Check BNP  -Check echo  -Use fluid with caution, avoid any diuresis at this time in the setting of sepsis.        Acute metabolic encephalopathy   Assessment & Plan    Due to infection, UTI versus influenza she does have presence of both  Treat infection  Hydrate  Monitor respiratory status        UTI (urinary tract infection)   Assessment & Plan    Follow UA, empiric ceftriaxone        Severe sepsis ([facility])   Assessment & Plan    This is severe sepsis with the following associated acute organ dysfunction(s): metabolic/septic encephalopathy.   She appears to have a UTI however influenza A was also positive.  -Follow-up urine culture, ceftriaxone  -Tamiflu  -Follow all cultures  -IV hydration, use caution in the setting of mild pulmonary edema        Essential hypertension- (present on admission)   Assessment & Plan    Blood pressures running in the 140s to 170s  Continue Coreg 6.25 twice a day  At 2nd medication if needed  . Hydralazine        RA (rheumatoid arthritis) ([facility])- (present on admission)   Assessment & Plan    Tylenol when necessary        Hyponatremia- (present on admission)   Assessment & Plan    Likely  volume depletion, infection  Normal saline, gentle  Repeated in the morning        Acquired hypothyroidism- (present on admission)   Assessment & Plan    Continue Synthroid        Dyslipidemia- (present on admission)   Assessment & Plan    Continue lovastatin            VTE prophylaxis: Lovenox.

## 2018-05-02 NOTE — PROGRESS NOTES
[facility] Hospitalist Progress Note    Date of Service: 2018    Chief Complaint  88 y.o. female with Hx of physical debility at baseline uses wheelchair for mobility, admitted 2018 with acute metabolic encephalopathy due to sepsis from influenza A as well as acute hypoxemia presumably from influenza.    Interval Problem Update  Clinically improving, patient more awake, and alert. Is complaining of cough and mild shortbess of breath.  Continue IV antibiotics.  Continue RT protocol, duo nebs, Pep therapy if warranted, and incentive spirometry.       Consultants/Specialty  None    Disposition  TBD        Review of Systems   Unable to perform ROS: Dementia   Constitutional: Positive for chills, diaphoresis, fever and malaise/fatigue.   HENT: Positive for ear pain and hearing loss. Negative for tinnitus.    Eyes: Negative for blurred vision, double vision and photophobia.   Respiratory: Positive for cough and shortness of breath. Negative for hemoptysis and wheezing.    Cardiovascular: Negative for chest pain, palpitations, orthopnea and claudication.   Musculoskeletal: Negative for myalgias and neck pain.   Psychiatric/Behavioral: Positive for memory loss.      Physical Exam  Laboratory/Imaging   Hemodynamics  Temp (24hrs), Av.7 °C (99.9 °F), Min:37.3 °C (99.1 °F), Max:38.6 °C (101.5 °F)   Temperature: 37.3 °C (99.2 °F)  Pulse  Av.2  Min: 91  Max: 106 Heart Rate (Monitored): 100  Blood Pressure : (!) 174/92, NIBP: (!) 167/83      Respiratory      Respiration: 16, Pulse Oximetry: 93 %        RUL Breath Sounds: Diminished, RML Breath Sounds: Diminished, RLL Breath Sounds: Diminished, HUGH Breath Sounds: Diminished, LLL Breath Sounds: Diminished    Fluids    Intake/Output Summary (Last 24 hours) at 18 0715  Last data filed at 18 0600   Gross per 24 hour   Intake              960 ml   Output                0 ml   Net              960 ml       Nutrition  Orders Placed This Encounter   Procedures   •  Diet Order     Standing Status:   Standing     Number of Occurrences:   1     Order Specific Question:   Diet:     Answer:   Cardiac [6]     Order Specific Question:   Diet:     Answer:   2 Gram Sodium [7]     Physical Exam   Constitutional: She appears well-nourished. No distress.   HENT:   Head: Normocephalic and atraumatic.   Eyes: Conjunctivae are normal. Right eye exhibits no discharge. Left eye exhibits no discharge.   Neck: Normal range of motion. Neck supple. No thyromegaly present.   Cardiovascular: Normal rate.    No murmur heard.  Pulmonary/Chest: Effort normal and breath sounds normal. No stridor. No respiratory distress. She has no wheezes.   Crackles diffuse b/l    Abdominal: Bowel sounds are normal. She exhibits no distension. There is no tenderness. There is no rebound.   Musculoskeletal: She exhibits no edema.   Neurological: She is alert. No cranial nerve deficit.   Skin: Skin is warm and dry. She is not diaphoretic. No erythema.   Psychiatric: She has a normal mood and affect.       Recent Labs      05/01/18   1411  05/02/18   0046   WBC  15.0*  13.5*   RBC  4.77  4.90   HEMOGLOBIN  14.9  15.5   HEMATOCRIT  44.8  45.5   MCV  93.9  92.9   MCH  31.2  31.6   MCHC  33.3*  34.1   RDW  45.7  45.2   PLATELETCT  181  169   MPV  9.5  9.7     Recent Labs      05/01/18   1411  05/02/18   0046   SODIUM  133*  132*   POTASSIUM  3.7  3.5*   CHLORIDE  100  98   CO2  22  21   GLUCOSE  135*  152*   BUN  17  21   CREATININE  0.67  0.73   CALCIUM  8.8  9.0         Recent Labs      05/02/18   0046   BNPBTYPENAT  281*              Assessment/Plan     * Influenza A   Assessment & Plan    Influenza A positive.  Continue course of tamiflu.  Sepsis protocol.        Acute respiratory failure with hypoxia (HCC)   Assessment & Plan    CXR: Pulmonary vascular congestion and probable minimal pulmonary edema.     Check BNP  Last echo in 2016 preserved LVEF 60%  Repeat echocardiogram.  Will attempt diuresis once patient  stable from sepsis perspective.  Continue RT protocol, duo nebs, Pep therapy if warranted, and incentive spirometry.             Acute metabolic encephalopathy   Assessment & Plan    From sepsis, Influenza, and UTI.  Resolving  Continue treatment of underlying issue.        UTI (urinary tract infection)   Assessment & Plan    + UA  Continue IV Ceftriaxone  Blood and urine cultures pending.        Severe sepsis ([facility])   Assessment & Plan    This is severe sepsis with the following associated acute organ dysfunction(s): metabolic/septic encephalopathy.   Positive UTI however influenza A was also positive.  As above, continue IV abx.  Follow up on cultures.      Essential hypertension- (present on admission)   Assessment & Plan    Better controlled today  Continue Coreg.   IV hydralazine prn if sbp >180s.        RA (rheumatoid arthritis) ([facility])- (present on admission)   Assessment & Plan    Tylenol prn        Hyponatremia- (present on admission)   Assessment & Plan    2/2 to volume depletion  Cont IV fluids  CTM.        Acquired hypothyroidism- (present on admission)   Assessment & Plan    Continue Synthroid        Dyslipidemia- (present on admission)   Assessment & Plan    Continue lovastatin     Patient plan of care discussed at multidisplinary team rounds and with patient and R.N at beside.       Quality-Core Measures   Reviewed items::  Labs reviewed, Radiology images reviewed, Medications reviewed and EKG reviewed  Thornton catheter::  No Thornton  DVT prophylaxis pharmacological::  Heparin  DVT prophylaxis - mechanical:  SCDs  Antibiotics:  Treating active infection/contamination beyond 24 hours perioperative coverage

## 2018-05-02 NOTE — DISCHARGE PLANNING
Anticipated Discharge Disposition: SNF    Action: LSW emailed SNF options to pt caregiver (Radha ). Radha to email or call this LSW back for choice.    Barriers to Discharge: None    Plan: LSW to f/u with family for SNF choice.

## 2018-05-02 NOTE — THERAPY
"Occupational Therapy Evaluation completed.   Functional Status:  Pt s/p sepsis, UTI, acute metabolic encephalopathy. Pt was total assist of 2 people for pericare post episode of incontinence, Trace/Trace + sitting eob balance, increased c/o  Nausea with multiple episodes of vomiting throughout the session; required positioning of emesis bag, physical and verbal cues to extend neck, baseline neck in flexion and c/o pain with attempts to facilitate upright posture. Pt currently is at high risk for aspiration given lethargy, cognitive deficits and impaired GM and FM coordination, recommend 1:1 feeding and would benefit from ST consult for safe diet recommendations. Per chart review, it appears pt has caregivers that assist with all ADLs and functional mobility but unclear if pt was able to assist at all. Currently, pt requires total assist of 2-3 person and would need 24/7 direct care giving. Will continue to follow pt while in house and assist with d/c plan as needed.   Plan of Care: Will benefit from Occupational Therapy 2 times per week  Discharge Recommendations:  Equipment: Will Continue to Assess for Equipment Needs.     See \"Rehab Therapy-Acute\" Patient Summary Report for complete documentation.    "

## 2018-05-02 NOTE — ASSESSMENT & PLAN NOTE
CXR: Pulmonary vascular congestion and probable minimal pulmonary edema.     Last echo in 2016 preserved LVEF 60%  Repeat echocardiogram LVEF 60%  Continue RT protocol, duo nebs, Pep therapy if warranted, and incentive spirometry.   At baseline now

## 2018-05-02 NOTE — ASSESSMENT & PLAN NOTE
Blood pressures running in the 140s to 170s  Continue Coreg 6.25 twice a day  At 2nd medication if needed  . Hydralazine

## 2018-05-02 NOTE — PROGRESS NOTES
Report received. Pt A&Ox2 disoriented to time and situation. LDAs functioning properly. Discussed POC with pt. Reports abdominal pain at this time, medicated per MAR recently. Pt appears anxious. Call light within reach. Fall precautions in place.

## 2018-05-02 NOTE — PROGRESS NOTES
Call from PT/OT, patient was incontinent of bowel and bladder during activity, turned and cleaned x4, patient began vomiting profusely. Physician notified, order for phenergan suppository. Medication administered. Patient incontinent of bowel and bladder again. Turned and cleaned. Patient continues to vomit. Suction at bedside. Patient remains disoriented.

## 2018-05-02 NOTE — RESPIRATORY CARE
COPD EDUCATION by COPD CLINICAL EDUCATOR  5/2/2018 at 6:19 AM by Nathalie Omer     Patient reviewed by COPD education team. Patient does not qualify for COPD program.

## 2018-05-02 NOTE — THERAPY
"Speech Language Therapy Clinical Swallow Evaluation completed.  Functional Status: Patient on a cardiac diet earlier, but RN noted s/sx of aspiration during meal today. Patient familiar to this SLP from previous prolonged admit to [facility] last year. Patient has hx of SLP services with prolonged dysphagia, multiple diagnostic swallow studies, and SILENT aspiration on thin liquids. Patient awake, asking for water, but lethargic and weak during evaluation. Patient noted to have full cervical flexion with chin to chest during entire evaluation. Patient was able to lift head/chin up for short amounts only when prompted. Patient lethargic and confused throughout evaluation. Patient was given PO trials of ice chips, NTL via tsp and cup sip, purees, and thin liquids via tsp and cup sip. Patient spit out ice chips. Patient consumed PO trials of NTL and thin liquids with wet/gurgly vocal quality and intermittent coughing. Patient had no overt s/sx of aspiration on purees. Initiation of swallow trigger was delayed and laryngeal elevation was palpated as weak. At this time, patient does not appear safe for PO intake. Recommend patient continue strict NPO with reassessment with SLP in AM. SLP to follow. Thank you for the consult.     Of note, during patient's previous prolonged hospital stay, patient's  was not allowed at [facility] per security.  present at bedside at end of evaluation today. Discussed with RN and CNA. RN will discuss with charge RN who will discuss with security.     Recommendations - Diet: Continue NPO with reassessment in AM, Pre-Feeding Trials with SLP Only                          Strategies: To be assessed                           Medication Administration: Crush all Medications in Puree pending SLP reassessment  Plan of Care: Will benefit from Speech Therapy 3 times per week  Post-Acute Therapy: Discharge to a transitional care facility for continued skilled therapy services.    See \"Rehab " "Therapy-Acute\" Patient Summary Report for complete documentation.   "

## 2018-05-02 NOTE — CARE PLAN
Problem: Communication  Goal: The ability to communicate needs accurately and effectively will improve  Outcome: PROGRESSING SLOWER THAN EXPECTED  Patient does not use call light despite education.

## 2018-05-02 NOTE — CARE PLAN
Problem: Communication  Goal: The ability to communicate needs accurately and effectively will improve  Outcome: PROGRESSING AS EXPECTED  Reinforce use of call light. Pt updated on POC. Pt encouraged to ask questions regarding care. Reoriented as needed.       Problem: Safety  Goal: Will remain free from falls  Outcome: PROGRESSING AS EXPECTED  Pt educated regarding fall precautions, confirms understanding. Bed alarm on. Call light within reach. Bed in low position. Non-skid socks in place.

## 2018-05-02 NOTE — THERAPY
"Physical Therapy Evaluation completed.   Bed Mobility:  Supine to Sit: Total Assist X 2  Transfers: Sit to Stand: Unable to Participate  Gait: Level Of Assist: Unable to Participate     Plan of Care: Will benefit from Physical Therapy 2 times per week  Discharge Recommendations: Equipment: Will Continue to Assess for Equipment Needs.    Pt presents with sepsis, UTI, acute metabolic encephalopathy. Pt is fragile, vomiting several times during therapy and unable to hold her head up off her chest, at risk for aspiration. Pt will benefit from inpt PT to address problems and assist with d/c plan. Pt will need 24/7 assist if she is to safely d/c home.     See \"Rehab Therapy-Acute\" Patient Summary Report for complete documentation.     "

## 2018-05-03 LAB
ALBUMIN SERPL BCP-MCNC: 3.7 G/DL (ref 3.2–4.9)
ALBUMIN/GLOB SERPL: 1.3 G/DL
ALP SERPL-CCNC: 38 U/L (ref 30–99)
ALT SERPL-CCNC: 8 U/L (ref 2–50)
ANION GAP SERPL CALC-SCNC: 13 MMOL/L (ref 0–11.9)
AST SERPL-CCNC: 23 U/L (ref 12–45)
BACTERIA UR CULT: NORMAL
BASOPHILS # BLD AUTO: 0.2 % (ref 0–1.8)
BASOPHILS # BLD: 0.04 K/UL (ref 0–0.12)
BILIRUB SERPL-MCNC: 0.6 MG/DL (ref 0.1–1.5)
BUN SERPL-MCNC: 21 MG/DL (ref 8–22)
CALCIUM SERPL-MCNC: 8.7 MG/DL (ref 8.5–10.5)
CHLORIDE SERPL-SCNC: 99 MMOL/L (ref 96–112)
CO2 SERPL-SCNC: 22 MMOL/L (ref 20–33)
CREAT SERPL-MCNC: 0.51 MG/DL (ref 0.5–1.4)
EOSINOPHIL # BLD AUTO: 0 K/UL (ref 0–0.51)
EOSINOPHIL NFR BLD: 0 % (ref 0–6.9)
ERYTHROCYTE [DISTWIDTH] IN BLOOD BY AUTOMATED COUNT: 44.5 FL (ref 35.9–50)
GLOBULIN SER CALC-MCNC: 2.9 G/DL (ref 1.9–3.5)
GLUCOSE SERPL-MCNC: 121 MG/DL (ref 65–99)
HCT VFR BLD AUTO: 45.3 % (ref 37–47)
HGB BLD-MCNC: 15.3 G/DL (ref 12–16)
IMM GRANULOCYTES # BLD AUTO: 0.09 K/UL (ref 0–0.11)
IMM GRANULOCYTES NFR BLD AUTO: 0.6 % (ref 0–0.9)
LYMPHOCYTES # BLD AUTO: 2.57 K/UL (ref 1–4.8)
LYMPHOCYTES NFR BLD: 16 % (ref 22–41)
MAGNESIUM SERPL-MCNC: 1.7 MG/DL (ref 1.5–2.5)
MCH RBC QN AUTO: 31.1 PG (ref 27–33)
MCHC RBC AUTO-ENTMCNC: 33.8 G/DL (ref 33.6–35)
MCV RBC AUTO: 92.1 FL (ref 81.4–97.8)
MONOCYTES # BLD AUTO: 0.74 K/UL (ref 0–0.85)
MONOCYTES NFR BLD AUTO: 4.6 % (ref 0–13.4)
NEUTROPHILS # BLD AUTO: 12.66 K/UL (ref 2–7.15)
NEUTROPHILS NFR BLD: 78.6 % (ref 44–72)
NRBC # BLD AUTO: 0 K/UL
NRBC BLD-RTO: 0 /100 WBC
PLATELET # BLD AUTO: 174 K/UL (ref 164–446)
PMV BLD AUTO: 9.9 FL (ref 9–12.9)
POTASSIUM SERPL-SCNC: 3.2 MMOL/L (ref 3.6–5.5)
PROT SERPL-MCNC: 6.6 G/DL (ref 6–8.2)
RBC # BLD AUTO: 4.92 M/UL (ref 4.2–5.4)
SIGNIFICANT IND 70042: NORMAL
SITE SITE: NORMAL
SODIUM SERPL-SCNC: 134 MMOL/L (ref 135–145)
SOURCE SOURCE: NORMAL
WBC # BLD AUTO: 16.1 K/UL (ref 4.8–10.8)

## 2018-05-03 PROCEDURE — 700111 HCHG RX REV CODE 636 W/ 250 OVERRIDE (IP): Performed by: INTERNAL MEDICINE

## 2018-05-03 PROCEDURE — A9270 NON-COVERED ITEM OR SERVICE: HCPCS | Performed by: INTERNAL MEDICINE

## 2018-05-03 PROCEDURE — 99232 SBSQ HOSP IP/OBS MODERATE 35: CPT | Performed by: HOSPITALIST

## 2018-05-03 PROCEDURE — 83735 ASSAY OF MAGNESIUM: CPT

## 2018-05-03 PROCEDURE — 700102 HCHG RX REV CODE 250 W/ 637 OVERRIDE(OP): Performed by: INTERNAL MEDICINE

## 2018-05-03 PROCEDURE — 700101 HCHG RX REV CODE 250: Performed by: FAMILY MEDICINE

## 2018-05-03 PROCEDURE — 700111 HCHG RX REV CODE 636 W/ 250 OVERRIDE (IP): Performed by: FAMILY MEDICINE

## 2018-05-03 PROCEDURE — 700102 HCHG RX REV CODE 250 W/ 637 OVERRIDE(OP): Performed by: HOSPITALIST

## 2018-05-03 PROCEDURE — 700105 HCHG RX REV CODE 258

## 2018-05-03 PROCEDURE — 85025 COMPLETE CBC W/AUTO DIFF WBC: CPT

## 2018-05-03 PROCEDURE — A9270 NON-COVERED ITEM OR SERVICE: HCPCS | Performed by: HOSPITALIST

## 2018-05-03 PROCEDURE — 36415 COLL VENOUS BLD VENIPUNCTURE: CPT

## 2018-05-03 PROCEDURE — 700111 HCHG RX REV CODE 636 W/ 250 OVERRIDE (IP): Performed by: HOSPITALIST

## 2018-05-03 PROCEDURE — 304222 HCHG STAT ISOLATION DAILY CHARGE

## 2018-05-03 PROCEDURE — 80053 COMPREHEN METABOLIC PANEL: CPT

## 2018-05-03 PROCEDURE — 92526 ORAL FUNCTION THERAPY: CPT

## 2018-05-03 PROCEDURE — 770020 HCHG ROOM/CARE - TELE (206)

## 2018-05-03 RX ORDER — SODIUM CHLORIDE 9 MG/ML
INJECTION, SOLUTION INTRAVENOUS
Status: COMPLETED
Start: 2018-05-03 | End: 2018-05-03

## 2018-05-03 RX ORDER — FUROSEMIDE 10 MG/ML
20 INJECTION INTRAMUSCULAR; INTRAVENOUS ONCE
Status: COMPLETED | OUTPATIENT
Start: 2018-05-03 | End: 2018-05-03

## 2018-05-03 RX ORDER — OSELTAMIVIR PHOSPHATE 75 MG/1
75 CAPSULE ORAL EVERY 12 HOURS
Status: COMPLETED | OUTPATIENT
Start: 2018-05-03 | End: 2018-05-08

## 2018-05-03 RX ADMIN — HYDRALAZINE HYDROCHLORIDE 10 MG: 20 INJECTION INTRAMUSCULAR; INTRAVENOUS at 03:15

## 2018-05-03 RX ADMIN — ONDANSETRON 4 MG: 2 INJECTION, SOLUTION INTRAMUSCULAR; INTRAVENOUS at 15:47

## 2018-05-03 RX ADMIN — CARVEDILOL 6.25 MG: 6.25 TABLET, FILM COATED ORAL at 17:44

## 2018-05-03 RX ADMIN — CEFTRIAXONE SODIUM 1 G: 10 INJECTION, POWDER, FOR SOLUTION INTRAVENOUS at 09:10

## 2018-05-03 RX ADMIN — CARVEDILOL 6.25 MG: 6.25 TABLET, FILM COATED ORAL at 09:14

## 2018-05-03 RX ADMIN — OSELTAMIVIR PHOSPHATE 75 MG: 75 CAPSULE ORAL at 13:18

## 2018-05-03 RX ADMIN — ENOXAPARIN SODIUM 40 MG: 100 INJECTION SUBCUTANEOUS at 05:38

## 2018-05-03 RX ADMIN — LATANOPROST 1 DROP: 50 SOLUTION OPHTHALMIC at 20:22

## 2018-05-03 RX ADMIN — LABETALOL HYDROCHLORIDE 20 MG: 5 INJECTION, SOLUTION INTRAVENOUS at 00:14

## 2018-05-03 RX ADMIN — SODIUM CHLORIDE 500 ML: 9 INJECTION, SOLUTION INTRAVENOUS at 00:17

## 2018-05-03 RX ADMIN — FUROSEMIDE 20 MG: 10 INJECTION, SOLUTION INTRAMUSCULAR; INTRAVENOUS at 09:10

## 2018-05-03 ASSESSMENT — PAIN SCALES - GENERAL
PAINLEVEL_OUTOF10: ASSUMED PAIN PRESENT
PAINLEVEL_OUTOF10: 2
PAINLEVEL_OUTOF10: 0

## 2018-05-03 ASSESSMENT — ENCOUNTER SYMPTOMS
HEMOPTYSIS: 0
PHOTOPHOBIA: 0
BLURRED VISION: 0
COUGH: 0
CHILLS: 0
ORTHOPNEA: 0
FEVER: 0
SHORTNESS OF BREATH: 0
NECK PAIN: 0
DIZZINESS: 0
DIAPHORESIS: 0
CLAUDICATION: 0
MYALGIAS: 0
PALPITATIONS: 0
WHEEZING: 0
HEADACHES: 0
MEMORY LOSS: 1
DOUBLE VISION: 0

## 2018-05-03 NOTE — PROGRESS NOTES
[facility] Hospitalist Progress Note    Date of Service: 5/3/2018    Chief Complaint  88 y.o. female with Hx of physical debility at baseline uses wheelchair for mobility, admitted 2018 with acute metabolic encephalopathy due to sepsis from influenza A as well as acute hypoxemia presumably from influenza.    Interval Problem Update  Clinically improving, patient more awake, and alert. Is complaining of cough and mild shortbess of breath.  Continue IV antibiotics.  Continue RT protocol, duo nebs, Pep therapy if warranted, and incentive spirometry.     5/3  Patient more awake today and alert, feeling better. Still has underlying dementia hence a poor historian. Overnight patient was found to be hypertensive and also complained of chest heavyness. EKG was obtained which was negative.  Continue IV antibiotics.  SNF Hospice pending?    Consultants/Specialty  None    Disposition  TBD        Review of Systems   Unable to perform ROS: Dementia   Constitutional: Positive for malaise/fatigue. Negative for chills, diaphoresis and fever.   HENT: Positive for hearing loss. Negative for ear pain and tinnitus.    Eyes: Negative for blurred vision, double vision and photophobia.   Respiratory: Negative for cough, hemoptysis, shortness of breath and wheezing.    Cardiovascular: Negative for chest pain, palpitations, orthopnea and claudication.   Musculoskeletal: Negative for myalgias and neck pain.   Neurological: Negative for dizziness and headaches.   Psychiatric/Behavioral: Positive for memory loss.      Physical Exam  Laboratory/Imaging   Hemodynamics  Temp (24hrs), Av °C (98.6 °F), Min:36.3 °C (97.3 °F), Max:37.4 °C (99.3 °F)   Temperature: 36.3 °C (97.3 °F)  Pulse  Av.8  Min: 79  Max: 106    Blood Pressure : (!) 182/90      Respiratory      Respiration: 16, Pulse Oximetry: 90 %        RUL Breath Sounds: Clear, RML Breath Sounds: Diminished, RLL Breath Sounds: Diminished, HUGH Breath Sounds: Clear, LLL Breath Sounds:  Diminished    Fluids    Intake/Output Summary (Last 24 hours) at 05/03/18 0717  Last data filed at 05/02/18 2000   Gross per 24 hour   Intake                0 ml   Output                0 ml   Net                0 ml       Nutrition  Orders Placed This Encounter   Procedures   • DIET NPO     Standing Status:   Standing     Number of Occurrences:   1     Order Specific Question:   Restrict to:     Answer:   Strict [1]     Comments:   Okay for urgent meds crushed in applesauce only      Physical Exam   Constitutional: She appears well-nourished. No distress.   HENT:   Head: Normocephalic and atraumatic.   Eyes: Conjunctivae are normal. Right eye exhibits no discharge. Left eye exhibits no discharge.   Neck: Normal range of motion. Neck supple. No thyromegaly present.   Cardiovascular: Normal rate.    No murmur heard.  Pulmonary/Chest: Effort normal and breath sounds normal. No stridor. No respiratory distress. She has no wheezes. She has no rales.   Minimal crackles R>L   Abdominal: Bowel sounds are normal. She exhibits no distension. There is no tenderness. There is no rebound.   Musculoskeletal: She exhibits no edema.   Neurological: She is alert. No cranial nerve deficit.   Skin: Skin is warm and dry. She is not diaphoretic. No erythema.   Psychiatric: She has a normal mood and affect.       Recent Labs      05/01/18   1411  05/02/18   0046  05/03/18   0218   WBC  15.0*  13.5*  16.1*   RBC  4.77  4.90  4.92   HEMOGLOBIN  14.9  15.5  15.3   HEMATOCRIT  44.8  45.5  45.3   MCV  93.9  92.9  92.1   MCH  31.2  31.6  31.1   MCHC  33.3*  34.1  33.8   RDW  45.7  45.2  44.5   PLATELETCT  181  169  174   MPV  9.5  9.7  9.9     Recent Labs      05/01/18   1411  05/02/18   0046  05/03/18   0219   SODIUM  133*  132*  134*   POTASSIUM  3.7  3.5*  3.2*   CHLORIDE  100  98  99   CO2  22  21  22   GLUCOSE  135*  152*  121*   BUN  17  21  21   CREATININE  0.67  0.73  0.51   CALCIUM  8.8  9.0  8.7         Recent Labs      05/02/18    0046   BNPBTYPENAT  281*              Assessment/Plan     * Influenza A   Assessment & Plan    Influenza A positive.  Continue course of tamiflu.  Continue Sepsis protocol.        Acute respiratory failure with hypoxia (HCC)   Assessment & Plan    CXR: Pulmonary vascular congestion and probable minimal pulmonary edema.     Check BNP  Last echo in 2016 preserved LVEF 60%  Repeat echocardiogram LVEF 60%  Continue RT protocol, duo nebs, Pep therapy if warranted, and incentive spirometry.             Acute metabolic encephalopathy   Assessment & Plan    From sepsis, Influenza, and UTI.  At baseline?   Continue treatment of underlying issue.        UTI (urinary tract infection)   Assessment & Plan    + UA  Continue IV Ceftriaxone  Blood and urine cultures pending. Neg so far        Severe sepsis ([facility])   Assessment & Plan    This is severe sepsis with the following associated acute organ dysfunction(s): metabolic/septic encephalopathy.   Positive UTI however influenza A was also positive.  As above, continue IV abx.  Follow up on cultures.      Essential hypertension- (present on admission)   Assessment & Plan    Uncontrolled at night  Continue Coreg. Add low dose lasix.  IV hydralazine prn if sbp >180s.        RA (rheumatoid arthritis) ([facility])- (present on admission)   Assessment & Plan    Tylenol prn        Hyponatremia- (present on admission)   Assessment & Plan    2/2 to volume depletion  Resolving CTM        Acquired hypothyroidism- (present on admission)   Assessment & Plan    Continue Synthroid        Dyslipidemia- (present on admission)   Assessment & Plan    Continue lovastatin     Patient plan of care discussed at multidisplinary team rounds and with patient and R.N at beside.       Quality-Core Measures   Reviewed items::  Labs reviewed, Radiology images reviewed, Medications reviewed and EKG reviewed  Thornton catheter::  No Thornton  DVT prophylaxis pharmacological::  Heparin  DVT prophylaxis - mechanical:   SCDs  Antibiotics:  Treating active infection/contamination beyond 24 hours perioperative coverage

## 2018-05-03 NOTE — PROGRESS NOTES
Monitor summary:    SR 83-90  BBB  1.4 second pause  Rare beats of 2nd degree type II, unsustained  Rare blocked PAC  .20/.16/.40

## 2018-05-03 NOTE — CARE PLAN
Problem: Skin Integrity  Goal: Risk for impaired skin integrity will decrease    Intervention: Assess risk factors for impaired skin integrity and/or pressure ulcers  RN educated patient regarding the importance of regular movement to decrease the risk of pressure ulcer formation.  Patient verbalized understanding of education and denies any questions at this time.

## 2018-05-03 NOTE — DISCHARGE PLANNING
Anticipated Discharge Disposition: SNF    Action: LSW spoke with JOSEYVETTE ZimmermanRadha who reports that family would [facility] to [facility] to [facility]) [facility] 2) [facility] 3) [place]. LSW faxed choice to [facility].     Barriers to Discharge: None    Plan: F/u with SNF's for acceptance or denial.    PASRR: On file. [id number]

## 2018-05-03 NOTE — DISCHARGE PLANNING
Call from Gina with [facility], patient can be accepted after 7 days of being on Tamiflu and will need to know a IV Abx stop date.

## 2018-05-03 NOTE — PROGRESS NOTES
Bedside report completed.  Assumed pt care.  Pt AAOx3, resting in bed comfortably with no signs of labored breathing.  Pt tele monitor in place, cardiac rhythm being monitored.  Pt call light within reach, bed in low position, non skid socks in place.  Pt denies any pain or other distress at this time.    Stat EKG evaluated by RN.  No changes observed.  EKG evaluated by [facility].  No concerns.

## 2018-05-03 NOTE — DISCHARGE PLANNING
Received choice form from [facility] for patients SNF services, referral has been sent to [facility] (1), [facility] (2) and [facility] (3) per patient request.

## 2018-05-03 NOTE — CARE PLAN
Problem: Safety  Goal: Will remain free from injury    Intervention: Provide assistance with mobility   05/03/18 0342   OTHER   Assistance / Tolerance Assistance of Two or More     RN educated patient regarding the importance of calling for assistance.  Patient verbalized understanding of education and denies any questions at this time.

## 2018-05-03 NOTE — PROGRESS NOTES
Received bedside report from RN, pt care assumed, VSS, pt assessment complete. Pt AAOx1, cronic c/o  pain at this time. No signs of acute distress noted at this time. POC discussed with pt and verbalizes no questions. Pt denies any additional needs at this time. Bed in lowest position, bed alarm on, pt educated on fall risk and verbalized understanding, call light within reach, hourly rounding initiated.  In a sinus rhythm.

## 2018-05-03 NOTE — PROGRESS NOTES
"RNs at bedside for report, patient found to be hypertensive, complaining of chest pain and heaviness. STAT EKG obtained. IV hydralazine given for hypertension. Charge nurse made aware. When asked to describe the chest pain/heaviness, patient states \"it feels like there is too much water\", unable to describe further. Patient currently AOx3-4 but still confused at times.   "

## 2018-05-04 LAB
ALBUMIN SERPL BCP-MCNC: 3.6 G/DL (ref 3.2–4.9)
ALBUMIN/GLOB SERPL: 1.2 G/DL
ALP SERPL-CCNC: 43 U/L (ref 30–99)
ALT SERPL-CCNC: 8 U/L (ref 2–50)
ANION GAP SERPL CALC-SCNC: 9 MMOL/L (ref 0–11.9)
AST SERPL-CCNC: 19 U/L (ref 12–45)
BASOPHILS # BLD AUTO: 0.2 % (ref 0–1.8)
BASOPHILS # BLD: 0.03 K/UL (ref 0–0.12)
BILIRUB SERPL-MCNC: 0.6 MG/DL (ref 0.1–1.5)
BUN SERPL-MCNC: 27 MG/DL (ref 8–22)
CALCIUM SERPL-MCNC: 9.1 MG/DL (ref 8.5–10.5)
CHLORIDE SERPL-SCNC: 102 MMOL/L (ref 96–112)
CO2 SERPL-SCNC: 26 MMOL/L (ref 20–33)
CREAT SERPL-MCNC: 0.61 MG/DL (ref 0.5–1.4)
EOSINOPHIL # BLD AUTO: 0.02 K/UL (ref 0–0.51)
EOSINOPHIL NFR BLD: 0.2 % (ref 0–6.9)
ERYTHROCYTE [DISTWIDTH] IN BLOOD BY AUTOMATED COUNT: 46 FL (ref 35.9–50)
GLOBULIN SER CALC-MCNC: 2.9 G/DL (ref 1.9–3.5)
GLUCOSE SERPL-MCNC: 137 MG/DL (ref 65–99)
HCT VFR BLD AUTO: 44.5 % (ref 37–47)
HGB BLD-MCNC: 15 G/DL (ref 12–16)
IMM GRANULOCYTES # BLD AUTO: 0.08 K/UL (ref 0–0.11)
IMM GRANULOCYTES NFR BLD AUTO: 0.6 % (ref 0–0.9)
LYMPHOCYTES # BLD AUTO: 2.61 K/UL (ref 1–4.8)
LYMPHOCYTES NFR BLD: 19.7 % (ref 22–41)
MAGNESIUM SERPL-MCNC: 2 MG/DL (ref 1.5–2.5)
MCH RBC QN AUTO: 31.3 PG (ref 27–33)
MCHC RBC AUTO-ENTMCNC: 33.7 G/DL (ref 33.6–35)
MCV RBC AUTO: 92.9 FL (ref 81.4–97.8)
MONOCYTES # BLD AUTO: 0.71 K/UL (ref 0–0.85)
MONOCYTES NFR BLD AUTO: 5.3 % (ref 0–13.4)
NEUTROPHILS # BLD AUTO: 9.83 K/UL (ref 2–7.15)
NEUTROPHILS NFR BLD: 74 % (ref 44–72)
NRBC # BLD AUTO: 0 K/UL
NRBC BLD-RTO: 0 /100 WBC
PLATELET # BLD AUTO: 163 K/UL (ref 164–446)
PMV BLD AUTO: 9.9 FL (ref 9–12.9)
POTASSIUM SERPL-SCNC: 3.1 MMOL/L (ref 3.6–5.5)
PROT SERPL-MCNC: 6.5 G/DL (ref 6–8.2)
RBC # BLD AUTO: 4.79 M/UL (ref 4.2–5.4)
SODIUM SERPL-SCNC: 137 MMOL/L (ref 135–145)
WBC # BLD AUTO: 13.3 K/UL (ref 4.8–10.8)

## 2018-05-04 PROCEDURE — 700102 HCHG RX REV CODE 250 W/ 637 OVERRIDE(OP): Performed by: HOSPITALIST

## 2018-05-04 PROCEDURE — 83735 ASSAY OF MAGNESIUM: CPT

## 2018-05-04 PROCEDURE — 85025 COMPLETE CBC W/AUTO DIFF WBC: CPT

## 2018-05-04 PROCEDURE — 80053 COMPREHEN METABOLIC PANEL: CPT

## 2018-05-04 PROCEDURE — 700102 HCHG RX REV CODE 250 W/ 637 OVERRIDE(OP): Performed by: INTERNAL MEDICINE

## 2018-05-04 PROCEDURE — 99232 SBSQ HOSP IP/OBS MODERATE 35: CPT | Performed by: HOSPITALIST

## 2018-05-04 PROCEDURE — A9270 NON-COVERED ITEM OR SERVICE: HCPCS | Performed by: HOSPITALIST

## 2018-05-04 PROCEDURE — 92526 ORAL FUNCTION THERAPY: CPT

## 2018-05-04 PROCEDURE — 36415 COLL VENOUS BLD VENIPUNCTURE: CPT

## 2018-05-04 PROCEDURE — 700111 HCHG RX REV CODE 636 W/ 250 OVERRIDE (IP): Performed by: INTERNAL MEDICINE

## 2018-05-04 PROCEDURE — 770021 HCHG ROOM/CARE - ISO PRIVATE

## 2018-05-04 PROCEDURE — 700111 HCHG RX REV CODE 636 W/ 250 OVERRIDE (IP): Performed by: HOSPITALIST

## 2018-05-04 PROCEDURE — A9270 NON-COVERED ITEM OR SERVICE: HCPCS | Performed by: INTERNAL MEDICINE

## 2018-05-04 PROCEDURE — 700111 HCHG RX REV CODE 636 W/ 250 OVERRIDE (IP): Performed by: FAMILY MEDICINE

## 2018-05-04 RX ORDER — AMLODIPINE BESYLATE 5 MG/1
5 TABLET ORAL
Status: DISCONTINUED | OUTPATIENT
Start: 2018-05-04 | End: 2018-05-08 | Stop reason: HOSPADM

## 2018-05-04 RX ADMIN — OSELTAMIVIR PHOSPHATE 75 MG: 75 CAPSULE ORAL at 11:41

## 2018-05-04 RX ADMIN — CARVEDILOL 6.25 MG: 6.25 TABLET, FILM COATED ORAL at 08:08

## 2018-05-04 RX ADMIN — ENOXAPARIN SODIUM 40 MG: 100 INJECTION SUBCUTANEOUS at 05:39

## 2018-05-04 RX ADMIN — OSELTAMIVIR PHOSPHATE 75 MG: 75 CAPSULE ORAL at 00:40

## 2018-05-04 RX ADMIN — CARVEDILOL 6.25 MG: 6.25 TABLET, FILM COATED ORAL at 16:31

## 2018-05-04 RX ADMIN — CEFTRIAXONE SODIUM 1 G: 10 INJECTION, POWDER, FOR SOLUTION INTRAVENOUS at 08:09

## 2018-05-04 RX ADMIN — LEVOTHYROXINE SODIUM 88 MCG: 88 TABLET ORAL at 05:43

## 2018-05-04 RX ADMIN — LATANOPROST 1 DROP: 50 SOLUTION OPHTHALMIC at 16:31

## 2018-05-04 RX ADMIN — ONDANSETRON 4 MG: 2 INJECTION, SOLUTION INTRAMUSCULAR; INTRAVENOUS at 10:14

## 2018-05-04 RX ADMIN — ACETAMINOPHEN 650 MG: 325 TABLET, FILM COATED ORAL at 11:41

## 2018-05-04 RX ADMIN — LOVASTATIN 20 MG: 20 TABLET ORAL at 05:38

## 2018-05-04 RX ADMIN — AMLODIPINE BESYLATE 5 MG: 5 TABLET ORAL at 08:08

## 2018-05-04 ASSESSMENT — ENCOUNTER SYMPTOMS
CLAUDICATION: 0
CHILLS: 0
MEMORY LOSS: 1
HEMOPTYSIS: 0
COUGH: 0
ORTHOPNEA: 0
DIAPHORESIS: 0
MYALGIAS: 0
WEAKNESS: 1
SHORTNESS OF BREATH: 0
DOUBLE VISION: 0
NECK PAIN: 0
PHOTOPHOBIA: 0
WHEEZING: 0
DIZZINESS: 0
PALPITATIONS: 0
HEADACHES: 0
BLURRED VISION: 0
FEVER: 0

## 2018-05-04 ASSESSMENT — PAIN SCALES - GENERAL
PAINLEVEL_OUTOF10: 0
PAINLEVEL_OUTOF10: 7

## 2018-05-04 ASSESSMENT — PATIENT HEALTH QUESTIONNAIRE - PHQ9
1. LITTLE INTEREST OR PLEASURE IN DOING THINGS: NOT AT ALL
2. FEELING DOWN, DEPRESSED, IRRITABLE, OR HOPELESS: NOT AT ALL
SUM OF ALL RESPONSES TO PHQ9 QUESTIONS 1 AND 2: 0

## 2018-05-04 NOTE — DISCHARGE PLANNING
Call from Yosvany Joseph, patient can be accepted for SNF services once patient has been medically cleared.

## 2018-05-04 NOTE — PROGRESS NOTES
Patient resting in bed. c/o headache. Admin pain med per order. Bed in low locked position, bed alarm on. Continue to monitor.

## 2018-05-04 NOTE — THERAPY
"Speech Language Therapy dysphagia treatment completed.   Functional Status:  Patient currently on NTFL diet and per RN, appears to be tolerating diet without overt difficulty. Patient sleeping when this SLP entered room. Patient roused with verbal and tactile cues and required intermittent verbal cues to sustain alertness and arousal during session. Patient only willing to consume PO trials of NTL via straw with this SLP today. Patient consumed all PO trials with 1:1 feeding, cues for swallow precautions and compensatory strategies of small sips, slow feeding rate, and chin tuck during swallows (this was noted to be effective during last diagnostic evaluation). Patient had no overt s/sx of aspiration during tx session. Laryngeal elevation palpated as weak, but patient not alert enough to complete exercises today. At this time, recommend patient continue NTFL diet with 1:1 feeding. Crush meds in applesauce. Please ensure patient is awake and alert for ALL PO intake. RN aware. SLP is following.     Recommendations: At this time, recommend patient continue NTFL diet with 1:1 feeding. Crush meds in applesauce. Please ensure patient is awake and alert for ALL PO intake.  Plan of Care: Will benefit from Speech Therapy 3 times per week  Post-Acute Therapy: Discharge to a transitional care facility for continued skilled therapy services.    See \"Rehab Therapy-Acute\" Patient Summary Report for complete documentation.     "

## 2018-05-04 NOTE — PROGRESS NOTES
Patient asleep in bed. Patient is now a medical patient. Tele monitor taken off. Bed in low locked position, call bell within reach. Continue to monitor.

## 2018-05-04 NOTE — PROGRESS NOTES
Patient's  and caregiver at bedside. Informed of patient's respiratory status with the FLU and need for droplet precautions. They stated that they were aware of patient having the flu but are refusing to wear a mask. Will continue to educate family.

## 2018-05-04 NOTE — CARE PLAN
Problem: Communication  Goal: The ability to communicate needs accurately and effectively will improve  Outcome: PROGRESSING SLOWER THAN EXPECTED  Patient educated on medications, procedures and use of call light. Patient will continue to verbalize and improve on education and communication in the plan of care.      Problem: Safety  Goal: Will remain free from injury  Outcome: PROGRESSING SLOWER THAN EXPECTED  Educated patient on use of call light, no slip socks on, bed lowest position. All needs attended to. Patient verbalized understanding.

## 2018-05-04 NOTE — PROGRESS NOTES
[facility] Hospitalist Progress Note    Date of Service: 2018    Chief Complaint  88 y.o. female with Hx of physical debility at baseline uses wheelchair for mobility, admitted 2018 with acute metabolic encephalopathy due to sepsis from influenza A as well as acute hypoxemia presumably from influenza.    Interval Problem Update  Clinically improving, patient more awake, and alert. Is complaining of cough and mild shortbess of breath.  Continue IV antibiotics.  Continue RT protocol, duo nebs, Pep therapy if warranted, and incentive spirometry.     5/3  Patient more awake today and alert, feeling better. Still has underlying dementia hence a poor historian. Overnight patient was found to be hypertensive and also complained of chest heavyness. EKG was obtained which was negative.  Continue IV antibiotics.      No acute issues, patient more alert but sleep most of day. Patient denies fevers/chills, chest pain, shortness of breath or nausea/vommiting.   Continue Tamiflu,   Placement after 7 days of being on tamiflu.  Continue RT protocol, duo nebs, Pep therapy if warranted, and incentive spirometry.   PT/OT in house    Consultants/Specialty  None    Disposition  TBD        Review of Systems   Unable to perform ROS: Dementia   Constitutional: Positive for malaise/fatigue. Negative for chills, diaphoresis and fever.   HENT: Positive for hearing loss. Negative for ear pain and tinnitus.    Eyes: Negative for blurred vision, double vision and photophobia.   Respiratory: Negative for cough, hemoptysis, shortness of breath and wheezing.    Cardiovascular: Negative for chest pain, palpitations, orthopnea and claudication.   Musculoskeletal: Negative for myalgias and neck pain.   Neurological: Positive for weakness. Negative for dizziness and headaches.   Psychiatric/Behavioral: Positive for memory loss.      Physical Exam  Laboratory/Imaging   Hemodynamics  Temp (24hrs), Av.1 °C (98.7 °F), Min:36 °C (96.8 °F),  Max:38.3 °C (100.9 °F)   Temperature: 36.2 °C (97.1 °F)  Pulse  Av.6  Min: 72  Max: 106    Blood Pressure : (!) 169/94 (RN notified)      Respiratory      Respiration: 18, Pulse Oximetry: 94 %        RUL Breath Sounds: Clear, RML Breath Sounds: Diminished, RLL Breath Sounds: Diminished, HUGH Breath Sounds: Clear, LLL Breath Sounds: Diminished    Fluids    Intake/Output Summary (Last 24 hours) at 18 0719  Last data filed at 18 0600   Gross per 24 hour   Intake              600 ml   Output                0 ml   Net              600 ml       Nutrition  Orders Placed This Encounter   Procedures   • DIET ORDER     Standing Status:   Standing     Number of Occurrences:   1     Order Specific Question:   Diet:     Answer:   Full Liquid [11]     Comments:   Crush meds in applesauce please     Order Specific Question:   Consistency/Fluid modifications:     Answer:   Nectar Thick [2]     Order Specific Question:   Miscellaneous modifications:     Answer:   SLP - 1:1 Supervision by Nursing [21]     Physical Exam   Constitutional: She appears well-nourished. No distress.   HENT:   Head: Normocephalic and atraumatic.   Eyes: Conjunctivae are normal. Right eye exhibits no discharge. Left eye exhibits no discharge.   Neck: Normal range of motion. Neck supple. No thyromegaly present.   Cardiovascular: Normal rate.    No murmur heard.  Pulmonary/Chest: Effort normal and breath sounds normal. No stridor. No respiratory distress. She has no wheezes. She has no rales.   Minimal crackles R>L   Abdominal: Bowel sounds are normal. She exhibits no distension. There is no tenderness. There is no rebound.   Musculoskeletal: She exhibits no edema.   Neurological: She is alert. No cranial nerve deficit.   Skin: Skin is warm and dry. She is not diaphoretic. No erythema.   Psychiatric: She has a normal mood and affect. Her behavior is normal.       Recent Labs      18   1411  18   0046  18   0218   WBC  15.0*   13.5*  16.1*   RBC  4.77  4.90  4.92   HEMOGLOBIN  14.9  15.5  15.3   HEMATOCRIT  44.8  45.5  45.3   MCV  93.9  92.9  92.1   MCH  31.2  31.6  31.1   MCHC  33.3*  34.1  33.8   RDW  45.7  45.2  44.5   PLATELETCT  181  169  174   MPV  9.5  9.7  9.9     Recent Labs      05/01/18   1411  05/02/18   0046  05/03/18   0219   SODIUM  133*  132*  134*   POTASSIUM  3.7  3.5*  3.2*   CHLORIDE  100  98  99   CO2  22  21  22   GLUCOSE  135*  152*  121*   BUN  17  21  21   CREATININE  0.67  0.73  0.51   CALCIUM  8.8  9.0  8.7         Recent Labs      05/02/18   0046   BNPBTYPENAT  281*              Assessment/Plan     * Influenza A   Assessment & Plan    Influenza A positive.  Continue course of tamiflu.  Continue Sepsis protocol.        Acute respiratory failure with hypoxia ([facility])   Assessment & Plan    CXR: Pulmonary vascular congestion and probable minimal pulmonary edema.     Last echo in 2016 preserved LVEF 60%  Repeat echocardiogram LVEF 60%  Continue RT protocol, duo nebs, Pep therapy if warranted, and incentive spirometry.   Improving clinically.            Acute metabolic encephalopathy   Assessment & Plan    From sepsis, Influenza, and UTI.  At baseline? More awake and responds to questions more appropriatly.  Continue treatment of underlying issue.        UTI (urinary tract infection)   Assessment & Plan    + UA  Continue IV Ceftriaxone  Blood and urine cultures pending. Neg so far        Severe sepsis ([facility])   Assessment & Plan    This is severe sepsis with the following associated acute organ dysfunction(s): metabolic/septic encephalopathy.   Positive UTI however influenza A was also positive.  As above, continue IV abx.  Follow up on cultures.        Essential hypertension- (present on admission)   Assessment & Plan    Uncontrolled at night  Continue Coreg. Added norvasc 5mg today  IV hydralazine prn if sbp >180s.        RA (rheumatoid arthritis) ([facility])- (present on admission)   Assessment & Plan    Tylenol  prn        Hyponatremia- (present on admission)   Assessment & Plan    2/2 to volume depletion  Resolving CTM        Acquired hypothyroidism- (present on admission)   Assessment & Plan    Continue Synthroid        Dyslipidemia- (present on admission)   Assessment & Plan    Continue lovastatin     Patient plan of care discussed at multidisplinary team rounds and with patient and R.N at beside.       Quality-Core Measures   Reviewed items::  Labs reviewed, Radiology images reviewed, Medications reviewed and EKG reviewed  Thornton catheter::  No Thornton  DVT prophylaxis pharmacological::  Heparin  DVT prophylaxis - mechanical:  SCDs  Antibiotics:  Treating active infection/contamination beyond 24 hours perioperative coverage

## 2018-05-04 NOTE — PROGRESS NOTES
Patient was given Lasix IV as per order. Unable to assess urinary output since patient is incontinent of urine and stool. Will attempt placing on bedpan every 2 hours.

## 2018-05-04 NOTE — PROGRESS NOTES
Report received from night rn. No issues overnight. Labs drawn in the am. Patient resting in bed. aox3, not oriented to time. Bed in low locked position, call bell within reach, bed alarm on. Will continue to monitor.

## 2018-05-04 NOTE — PROGRESS NOTES
Report received, pt care assumed, tele box on. VSS, pt assessment complete. Pt aaox3, no signs of distress noted at this time. POC discussed with pt and verbalizes no questions. Pt denies any additional needs at this time. Bed in lowest position, bed alarm on, pt educated on fall risk and verbalized understanding, call light within reach, will continue to monitor.

## 2018-05-05 LAB
ALBUMIN SERPL BCP-MCNC: 3.3 G/DL (ref 3.2–4.9)
ALBUMIN/GLOB SERPL: 1.2 G/DL
ALP SERPL-CCNC: 43 U/L (ref 30–99)
ALT SERPL-CCNC: 6 U/L (ref 2–50)
ANION GAP SERPL CALC-SCNC: 11 MMOL/L (ref 0–11.9)
AST SERPL-CCNC: 19 U/L (ref 12–45)
BASOPHILS # BLD AUTO: 0.2 % (ref 0–1.8)
BASOPHILS # BLD: 0.03 K/UL (ref 0–0.12)
BILIRUB SERPL-MCNC: 0.5 MG/DL (ref 0.1–1.5)
BUN SERPL-MCNC: 26 MG/DL (ref 8–22)
CALCIUM SERPL-MCNC: 9 MG/DL (ref 8.5–10.5)
CHLORIDE SERPL-SCNC: 102 MMOL/L (ref 96–112)
CO2 SERPL-SCNC: 26 MMOL/L (ref 20–33)
CREAT SERPL-MCNC: 0.58 MG/DL (ref 0.5–1.4)
EOSINOPHIL # BLD AUTO: 0.09 K/UL (ref 0–0.51)
EOSINOPHIL NFR BLD: 0.7 % (ref 0–6.9)
ERYTHROCYTE [DISTWIDTH] IN BLOOD BY AUTOMATED COUNT: 45.6 FL (ref 35.9–50)
GLOBULIN SER CALC-MCNC: 2.8 G/DL (ref 1.9–3.5)
GLUCOSE SERPL-MCNC: 118 MG/DL (ref 65–99)
HCT VFR BLD AUTO: 45.1 % (ref 37–47)
HGB BLD-MCNC: 15.4 G/DL (ref 12–16)
IMM GRANULOCYTES # BLD AUTO: 0.06 K/UL (ref 0–0.11)
IMM GRANULOCYTES NFR BLD AUTO: 0.5 % (ref 0–0.9)
LYMPHOCYTES # BLD AUTO: 3.26 K/UL (ref 1–4.8)
LYMPHOCYTES NFR BLD: 26.9 % (ref 22–41)
MAGNESIUM SERPL-MCNC: 2 MG/DL (ref 1.5–2.5)
MCH RBC QN AUTO: 31.9 PG (ref 27–33)
MCHC RBC AUTO-ENTMCNC: 34.1 G/DL (ref 33.6–35)
MCV RBC AUTO: 93.4 FL (ref 81.4–97.8)
MONOCYTES # BLD AUTO: 0.74 K/UL (ref 0–0.85)
MONOCYTES NFR BLD AUTO: 6.1 % (ref 0–13.4)
NEUTROPHILS # BLD AUTO: 7.93 K/UL (ref 2–7.15)
NEUTROPHILS NFR BLD: 65.6 % (ref 44–72)
NRBC # BLD AUTO: 0 K/UL
NRBC BLD-RTO: 0 /100 WBC
PLATELET # BLD AUTO: 176 K/UL (ref 164–446)
PMV BLD AUTO: 10.4 FL (ref 9–12.9)
POTASSIUM SERPL-SCNC: 3.2 MMOL/L (ref 3.6–5.5)
PROT SERPL-MCNC: 6.1 G/DL (ref 6–8.2)
RBC # BLD AUTO: 4.83 M/UL (ref 4.2–5.4)
SODIUM SERPL-SCNC: 139 MMOL/L (ref 135–145)
WBC # BLD AUTO: 12.1 K/UL (ref 4.8–10.8)

## 2018-05-05 PROCEDURE — 770021 HCHG ROOM/CARE - ISO PRIVATE

## 2018-05-05 PROCEDURE — A9270 NON-COVERED ITEM OR SERVICE: HCPCS | Performed by: INTERNAL MEDICINE

## 2018-05-05 PROCEDURE — 85025 COMPLETE CBC W/AUTO DIFF WBC: CPT

## 2018-05-05 PROCEDURE — 700102 HCHG RX REV CODE 250 W/ 637 OVERRIDE(OP): Performed by: INTERNAL MEDICINE

## 2018-05-05 PROCEDURE — 99232 SBSQ HOSP IP/OBS MODERATE 35: CPT | Performed by: HOSPITALIST

## 2018-05-05 PROCEDURE — 700102 HCHG RX REV CODE 250 W/ 637 OVERRIDE(OP): Performed by: HOSPITALIST

## 2018-05-05 PROCEDURE — A9270 NON-COVERED ITEM OR SERVICE: HCPCS | Performed by: HOSPITALIST

## 2018-05-05 PROCEDURE — 36415 COLL VENOUS BLD VENIPUNCTURE: CPT

## 2018-05-05 PROCEDURE — 700111 HCHG RX REV CODE 636 W/ 250 OVERRIDE (IP): Performed by: FAMILY MEDICINE

## 2018-05-05 PROCEDURE — 83735 ASSAY OF MAGNESIUM: CPT

## 2018-05-05 PROCEDURE — 80053 COMPREHEN METABOLIC PANEL: CPT

## 2018-05-05 PROCEDURE — 700111 HCHG RX REV CODE 636 W/ 250 OVERRIDE (IP): Performed by: HOSPITALIST

## 2018-05-05 PROCEDURE — 700111 HCHG RX REV CODE 636 W/ 250 OVERRIDE (IP): Performed by: INTERNAL MEDICINE

## 2018-05-05 RX ORDER — POTASSIUM CHLORIDE 20 MEQ/1
40 TABLET, EXTENDED RELEASE ORAL ONCE
Status: COMPLETED | OUTPATIENT
Start: 2018-05-05 | End: 2018-05-05

## 2018-05-05 RX ADMIN — STANDARDIZED SENNA CONCENTRATE AND DOCUSATE SODIUM 2 TABLET: 8.6; 5 TABLET, FILM COATED ORAL at 16:25

## 2018-05-05 RX ADMIN — ONDANSETRON 4 MG: 2 INJECTION, SOLUTION INTRAMUSCULAR; INTRAVENOUS at 19:44

## 2018-05-05 RX ADMIN — CARVEDILOL 6.25 MG: 6.25 TABLET, FILM COATED ORAL at 16:25

## 2018-05-05 RX ADMIN — LOVASTATIN 20 MG: 20 TABLET ORAL at 06:00

## 2018-05-05 RX ADMIN — OSELTAMIVIR PHOSPHATE 75 MG: 75 CAPSULE ORAL at 13:14

## 2018-05-05 RX ADMIN — CEFTRIAXONE SODIUM 1 G: 10 INJECTION, POWDER, FOR SOLUTION INTRAVENOUS at 08:34

## 2018-05-05 RX ADMIN — ONDANSETRON 4 MG: 2 INJECTION, SOLUTION INTRAMUSCULAR; INTRAVENOUS at 13:08

## 2018-05-05 RX ADMIN — POTASSIUM CHLORIDE 40 MEQ: 1500 TABLET, EXTENDED RELEASE ORAL at 08:34

## 2018-05-05 RX ADMIN — ONDANSETRON 4 MG: 2 INJECTION, SOLUTION INTRAMUSCULAR; INTRAVENOUS at 06:09

## 2018-05-05 RX ADMIN — CARVEDILOL 6.25 MG: 6.25 TABLET, FILM COATED ORAL at 08:34

## 2018-05-05 RX ADMIN — LATANOPROST 1 DROP: 50 SOLUTION OPHTHALMIC at 21:34

## 2018-05-05 RX ADMIN — OSELTAMIVIR PHOSPHATE 75 MG: 75 CAPSULE ORAL at 00:30

## 2018-05-05 RX ADMIN — AMLODIPINE BESYLATE 5 MG: 5 TABLET ORAL at 06:04

## 2018-05-05 RX ADMIN — ENOXAPARIN SODIUM 40 MG: 100 INJECTION SUBCUTANEOUS at 06:04

## 2018-05-05 RX ADMIN — LEVOTHYROXINE SODIUM 88 MCG: 88 TABLET ORAL at 06:04

## 2018-05-05 ASSESSMENT — ENCOUNTER SYMPTOMS
ORTHOPNEA: 0
DEPRESSION: 0
HEARTBURN: 0
VOMITING: 0
MEMORY LOSS: 1
SHORTNESS OF BREATH: 0
DOUBLE VISION: 0
DIZZINESS: 0
MYALGIAS: 0
NECK PAIN: 0
COUGH: 0
FEVER: 0
WEAKNESS: 1
DIAPHORESIS: 0
WHEEZING: 0
PHOTOPHOBIA: 0
CLAUDICATION: 0
BLURRED VISION: 0
PALPITATIONS: 0
CHILLS: 0
NAUSEA: 0
HEADACHES: 0
HEMOPTYSIS: 0

## 2018-05-05 ASSESSMENT — PAIN SCALES - GENERAL: PAINLEVEL_OUTOF10: 0

## 2018-05-05 NOTE — PROGRESS NOTES
Report received, pt care assumed, tele box off (pt medical). VSS, pt assessment complete. Pt aaox3, no signs of distress noted at this time. POC discussed with pt and verbalizes no questions. Pt denies any additional needs at this time. Bed in lowest position, bed alarm on, pt educated on fall risk and verbalized understanding, call light within reach, will continue to monitor.

## 2018-05-05 NOTE — PROGRESS NOTES
[facility] Hospitalist Progress Note    Date of Service: 5/5/2018    Chief Complaint  88 y.o. female with Hx of physical debility at baseline uses wheelchair for mobility, admitted 5/1/2018 with acute metabolic encephalopathy due to sepsis from influenza A as well as acute hypoxemia presumably from influenza.    Interval Problem Update  Clinically improving, patient more awake, and alert. Is complaining of cough and mild shortbess of breath.  Continue IV antibiotics.  Continue RT protocol, duo nebs, Pep therapy if warranted, and incentive spirometry.     5/3  Patient more awake today and alert, feeling better. Still has underlying dementia hence a poor historian. Overnight patient was found to be hypertensive and also complained of chest heavyness. EKG was obtained which was negative.  Continue IV antibiotics.    5/4  No acute issues, patient more alert but sleep most of day. Patient denies fevers/chills, chest pain, shortness of breath or nausea/vommiting.   Continue Tamiflu,   Placement after 7 days of being on tamiflu.  Continue RT protocol, duo nebs, Pep therapy if warranted, and incentive spirometry.   PT/OT in house    5/5  Patient comfortable, watching tv, no acute needs, denies chest pain  Cont tamiflu,   Placement after stopping tamiflu  Continue RT protocol, duo nebs, Pep therapy if warranted, and incentive spirometry.   Continue IV Ceftriaxone x 7 days for complicated uti.    Consultants/Specialty  None    Disposition  TBD        Review of Systems   Unable to perform ROS: Dementia   Constitutional: Positive for malaise/fatigue. Negative for chills, diaphoresis and fever.   HENT: Positive for hearing loss. Negative for ear pain and tinnitus.    Eyes: Negative for blurred vision, double vision and photophobia.   Respiratory: Negative for cough, hemoptysis, shortness of breath and wheezing.    Cardiovascular: Negative for chest pain, palpitations, orthopnea and claudication.   Gastrointestinal: Negative for  heartburn, nausea and vomiting.   Genitourinary: Negative for dysuria, frequency and urgency.   Musculoskeletal: Negative for myalgias and neck pain.   Neurological: Positive for weakness. Negative for dizziness and headaches.   Psychiatric/Behavioral: Positive for memory loss. Negative for depression.      Physical Exam  Laboratory/Imaging   Hemodynamics  Temp (24hrs), Av.7 °C (98 °F), Min:36.4 °C (97.6 °F), Max:36.9 °C (98.4 °F)   Temperature: 36.7 °C (98.1 °F)  Pulse  Av.9  Min: 72  Max: 106    Blood Pressure : (!) 165/92      Respiratory      Respiration: 20, Pulse Oximetry: 90 %        RUL Breath Sounds: Clear, RML Breath Sounds: Diminished, RLL Breath Sounds: Diminished, HUGH Breath Sounds: Clear, LLL Breath Sounds: Diminished    Fluids    Intake/Output Summary (Last 24 hours) at 18 0721  Last data filed at 18 0600   Gross per 24 hour   Intake              480 ml   Output                0 ml   Net              480 ml       Nutrition  Orders Placed This Encounter   Procedures   • DIET ORDER     Standing Status:   Standing     Number of Occurrences:   1     Order Specific Question:   Diet:     Answer:   Full Liquid [11]     Comments:   Crush meds in applesauce please     Order Specific Question:   Consistency/Fluid modifications:     Answer:   Nectar Thick [2]     Order Specific Question:   Miscellaneous modifications:     Answer:   SLP - 1:1 Supervision by Nursing [21]     Physical Exam   Constitutional: She appears well-nourished. No distress.   HENT:   Head: Normocephalic and atraumatic.   Eyes: Conjunctivae are normal. Pupils are equal, round, and reactive to light. Right eye exhibits no discharge. Left eye exhibits no discharge.   Neck: Normal range of motion. Neck supple. No thyromegaly present.   Cardiovascular: Normal rate.    No murmur heard.  Pulmonary/Chest: Effort normal and breath sounds normal. No stridor. No respiratory distress. She has no wheezes. She has no rales.   Minimal  crackles R>L   Abdominal: Bowel sounds are normal. She exhibits no distension. There is no tenderness. There is no rebound.   Musculoskeletal: She exhibits no edema.   Neurological: She is alert. No cranial nerve deficit. Coordination normal.   Skin: Skin is warm and dry. She is not diaphoretic. No erythema.   Psychiatric: She has a normal mood and affect. Her behavior is normal.       Recent Labs      05/03/18   0218  05/04/18   0735  05/05/18   0241   WBC  16.1*  13.3*  12.1*   RBC  4.92  4.79  4.83   HEMOGLOBIN  15.3  15.0  15.4   HEMATOCRIT  45.3  44.5  45.1   MCV  92.1  92.9  93.4   MCH  31.1  31.3  31.9   MCHC  33.8  33.7  34.1   RDW  44.5  46.0  45.6   PLATELETCT  174  163*  176   MPV  9.9  9.9  10.4     Recent Labs      05/03/18   0219  05/04/18   0735  05/05/18   0241   SODIUM  134*  137  139   POTASSIUM  3.2*  3.1*  3.2*   CHLORIDE  99  102  102   CO2  22  26  26   GLUCOSE  121*  137*  118*   BUN  21  27*  26*   CREATININE  0.51  0.61  0.58   CALCIUM  8.7  9.1  9.0                      Assessment/Plan     * Influenza A   Assessment & Plan    Influenza A positive.  Continue course of tamiflu.  Continue Sepsis protocol.        Acute respiratory failure with hypoxia (HCC)   Assessment & Plan    CXR: Pulmonary vascular congestion and probable minimal pulmonary edema.     Last echo in 2016 preserved LVEF 60%  Repeat echocardiogram LVEF 60%  Continue RT protocol, duo nebs, Pep therapy if warranted, and incentive spirometry.   At baseline now             Acute metabolic encephalopathy   Assessment & Plan    From sepsis, Influenza, and UTI.  At baseline? More awake.       UTI (urinary tract infection)   Assessment & Plan    + UA  Continue IV Ceftriaxone x 7 days   Blood and urine cultures pending. Neg so far        Severe sepsis (HCC)   Assessment & Plan    This is severe sepsis with the following associated acute organ dysfunction(s): metabolic/septic encephalopathy.   Positive UTI however influenza A was  also positive.  As above, continue IV abx.  Cultures neg.        Essential hypertension- (present on admission)   Assessment & Plan    Better controlled   Continue Coreg, norvasc 5mg today  IV hydralazine prn if sbp >180s.        RA (rheumatoid arthritis) (HCC)- (present on admission)   Assessment & Plan    Tylenol prn        Hyponatremia- (present on admission)   Assessment & Plan    2/2 to volume depletion  Resolved  CTM        Acquired hypothyroidism- (present on admission)   Assessment & Plan    Continue Synthroid        Dyslipidemia- (present on admission)   Assessment & Plan    Continue lovastatin     Patient plan of care discussed at multidisplinary team rounds and with patient and R.N at [facility].       Quality-Core Measures   Reviewed items::  Labs reviewed, Radiology images reviewed, Medications reviewed and EKG reviewed  Thornton catheter::  No Thornton  DVT prophylaxis pharmacological::  Heparin  DVT prophylaxis - mechanical:  SCDs  Antibiotics:  Treating active infection/contamination beyond 24 hours perioperative coverage

## 2018-05-05 NOTE — CARE PLAN
Problem: Communication  Goal: The ability to communicate needs accurately and effectively will improve  Outcome: PROGRESSING SLOWER THAN EXPECTED  Patient educated on medications, procedures and use of call light. Patient will continue to verbalize and improve on education and communication in the plan of care.      Problem: Safety  Goal: Will remain free from falls  Outcome: PROGRESSING SLOWER THAN EXPECTED  Educated patient on use of call light, no slip socks on, bed lowest position. All needs attended to. Patient verbalized understanding.

## 2018-05-06 LAB
ALBUMIN SERPL BCP-MCNC: 3.4 G/DL (ref 3.2–4.9)
ALBUMIN/GLOB SERPL: 1.3 G/DL
ALP SERPL-CCNC: 44 U/L (ref 30–99)
ALT SERPL-CCNC: 8 U/L (ref 2–50)
ANION GAP SERPL CALC-SCNC: 6 MMOL/L (ref 0–11.9)
AST SERPL-CCNC: 18 U/L (ref 12–45)
BACTERIA BLD CULT: NORMAL
BACTERIA BLD CULT: NORMAL
BASOPHILS # BLD AUTO: 0.9 % (ref 0–1.8)
BASOPHILS # BLD: 0.12 K/UL (ref 0–0.12)
BILIRUB SERPL-MCNC: 0.6 MG/DL (ref 0.1–1.5)
BUN SERPL-MCNC: 19 MG/DL (ref 8–22)
CALCIUM SERPL-MCNC: 9.2 MG/DL (ref 8.5–10.5)
CHLORIDE SERPL-SCNC: 101 MMOL/L (ref 96–112)
CO2 SERPL-SCNC: 28 MMOL/L (ref 20–33)
CREAT SERPL-MCNC: 0.61 MG/DL (ref 0.5–1.4)
EOSINOPHIL # BLD AUTO: 0.12 K/UL (ref 0–0.51)
EOSINOPHIL NFR BLD: 0.9 % (ref 0–6.9)
ERYTHROCYTE [DISTWIDTH] IN BLOOD BY AUTOMATED COUNT: 44.8 FL (ref 35.9–50)
GLOBULIN SER CALC-MCNC: 2.7 G/DL (ref 1.9–3.5)
GLUCOSE SERPL-MCNC: 104 MG/DL (ref 65–99)
HCT VFR BLD AUTO: 44 % (ref 37–47)
HGB BLD-MCNC: 15.2 G/DL (ref 12–16)
LYMPHOCYTES # BLD AUTO: 4.63 K/UL (ref 1–4.8)
LYMPHOCYTES NFR BLD: 34.8 % (ref 22–41)
MAGNESIUM SERPL-MCNC: 1.8 MG/DL (ref 1.5–2.5)
MANUAL DIFF BLD: NORMAL
MCH RBC QN AUTO: 31.7 PG (ref 27–33)
MCHC RBC AUTO-ENTMCNC: 34.5 G/DL (ref 33.6–35)
MCV RBC AUTO: 91.9 FL (ref 81.4–97.8)
MONOCYTES # BLD AUTO: 0.36 K/UL (ref 0–0.85)
MONOCYTES NFR BLD AUTO: 2.7 % (ref 0–13.4)
MORPHOLOGY BLD-IMP: NORMAL
NEUTROPHILS # BLD AUTO: 8.07 K/UL (ref 2–7.15)
NEUTROPHILS NFR BLD: 60.7 % (ref 44–72)
NRBC # BLD AUTO: 0 K/UL
NRBC BLD-RTO: 0 /100 WBC
PLATELET # BLD AUTO: 170 K/UL (ref 164–446)
PLATELET BLD QL SMEAR: NORMAL
PMV BLD AUTO: 10.7 FL (ref 9–12.9)
POTASSIUM SERPL-SCNC: 3.7 MMOL/L (ref 3.6–5.5)
PROT SERPL-MCNC: 6.1 G/DL (ref 6–8.2)
RBC # BLD AUTO: 4.79 M/UL (ref 4.2–5.4)
RBC BLD AUTO: PRESENT
SIGNIFICANT IND 70042: NORMAL
SIGNIFICANT IND 70042: NORMAL
SITE SITE: NORMAL
SITE SITE: NORMAL
SODIUM SERPL-SCNC: 135 MMOL/L (ref 135–145)
SOURCE SOURCE: NORMAL
SOURCE SOURCE: NORMAL
VARIANT LYMPHS BLD QL SMEAR: NORMAL
WBC # BLD AUTO: 13.3 K/UL (ref 4.8–10.8)

## 2018-05-06 PROCEDURE — 99232 SBSQ HOSP IP/OBS MODERATE 35: CPT | Performed by: HOSPITALIST

## 2018-05-06 PROCEDURE — 700111 HCHG RX REV CODE 636 W/ 250 OVERRIDE (IP): Performed by: HOSPITALIST

## 2018-05-06 PROCEDURE — 700102 HCHG RX REV CODE 250 W/ 637 OVERRIDE(OP): Performed by: INTERNAL MEDICINE

## 2018-05-06 PROCEDURE — 85027 COMPLETE CBC AUTOMATED: CPT

## 2018-05-06 PROCEDURE — A9270 NON-COVERED ITEM OR SERVICE: HCPCS | Performed by: INTERNAL MEDICINE

## 2018-05-06 PROCEDURE — 700111 HCHG RX REV CODE 636 W/ 250 OVERRIDE (IP): Performed by: INTERNAL MEDICINE

## 2018-05-06 PROCEDURE — 83735 ASSAY OF MAGNESIUM: CPT

## 2018-05-06 PROCEDURE — 700102 HCHG RX REV CODE 250 W/ 637 OVERRIDE(OP): Performed by: HOSPITALIST

## 2018-05-06 PROCEDURE — 36415 COLL VENOUS BLD VENIPUNCTURE: CPT

## 2018-05-06 PROCEDURE — 770021 HCHG ROOM/CARE - ISO PRIVATE

## 2018-05-06 PROCEDURE — 85007 BL SMEAR W/DIFF WBC COUNT: CPT

## 2018-05-06 PROCEDURE — 80053 COMPREHEN METABOLIC PANEL: CPT

## 2018-05-06 PROCEDURE — A9270 NON-COVERED ITEM OR SERVICE: HCPCS | Performed by: HOSPITALIST

## 2018-05-06 RX ADMIN — POLYETHYLENE GLYCOL 3350 1 PACKET: 17 POWDER, FOR SOLUTION ORAL at 05:45

## 2018-05-06 RX ADMIN — CARVEDILOL 6.25 MG: 6.25 TABLET, FILM COATED ORAL at 17:15

## 2018-05-06 RX ADMIN — STANDARDIZED SENNA CONCENTRATE AND DOCUSATE SODIUM 2 TABLET: 8.6; 5 TABLET, FILM COATED ORAL at 09:12

## 2018-05-06 RX ADMIN — LEVOTHYROXINE SODIUM 88 MCG: 88 TABLET ORAL at 05:50

## 2018-05-06 RX ADMIN — LOVASTATIN 20 MG: 20 TABLET ORAL at 05:44

## 2018-05-06 RX ADMIN — AMLODIPINE BESYLATE 5 MG: 5 TABLET ORAL at 05:44

## 2018-05-06 RX ADMIN — CARVEDILOL 6.25 MG: 6.25 TABLET, FILM COATED ORAL at 09:11

## 2018-05-06 RX ADMIN — STANDARDIZED SENNA CONCENTRATE AND DOCUSATE SODIUM 2 TABLET: 8.6; 5 TABLET, FILM COATED ORAL at 20:58

## 2018-05-06 RX ADMIN — CEFTRIAXONE SODIUM 1 G: 10 INJECTION, POWDER, FOR SOLUTION INTRAVENOUS at 09:13

## 2018-05-06 RX ADMIN — ENOXAPARIN SODIUM 40 MG: 100 INJECTION SUBCUTANEOUS at 05:45

## 2018-05-06 RX ADMIN — CYANOCOBALAMIN 1000 MCG: 1000 INJECTION, SOLUTION INTRAMUSCULAR; SUBCUTANEOUS at 09:12

## 2018-05-06 RX ADMIN — OSELTAMIVIR PHOSPHATE 75 MG: 75 CAPSULE ORAL at 00:23

## 2018-05-06 RX ADMIN — LATANOPROST 1 DROP: 50 SOLUTION OPHTHALMIC at 20:58

## 2018-05-06 RX ADMIN — OSELTAMIVIR PHOSPHATE 75 MG: 75 CAPSULE ORAL at 13:15

## 2018-05-06 ASSESSMENT — ENCOUNTER SYMPTOMS
SENSORY CHANGE: 0
MYALGIAS: 0
NAUSEA: 0
SPUTUM PRODUCTION: 0
BACK PAIN: 0
STRIDOR: 0
DIZZINESS: 0
ORTHOPNEA: 0
MEMORY LOSS: 0
BLURRED VISION: 0
SORE THROAT: 0
HEARTBURN: 0
SPEECH CHANGE: 0
TREMORS: 0
EYE PAIN: 0
FEVER: 0
DOUBLE VISION: 0
TINGLING: 0
COUGH: 0
HEADACHES: 0
PND: 0
PHOTOPHOBIA: 0
WEAKNESS: 1
PALPITATIONS: 0
BLOOD IN STOOL: 0
SHORTNESS OF BREATH: 0
CHILLS: 0
CLAUDICATION: 0
DEPRESSION: 0
NECK PAIN: 0
CONSTIPATION: 0
HEMOPTYSIS: 0
NERVOUS/ANXIOUS: 0
VOMITING: 0

## 2018-05-06 ASSESSMENT — COGNITIVE AND FUNCTIONAL STATUS - GENERAL
MOBILITY SCORE: 9
EATING MEALS: TOTAL
WALKING IN HOSPITAL ROOM: TOTAL
TOILETING: TOTAL
STANDING UP FROM CHAIR USING ARMS: A LOT
DRESSING REGULAR UPPER BODY CLOTHING: TOTAL
SUGGESTED CMS G CODE MODIFIER DAILY ACTIVITY: CN
HELP NEEDED FOR BATHING: TOTAL
CLIMB 3 TO 5 STEPS WITH RAILING: TOTAL
MOVING FROM LYING ON BACK TO SITTING ON SIDE OF FLAT BED: UNABLE
TURNING FROM BACK TO SIDE WHILE IN FLAT BAD: A LOT
SUGGESTED CMS G CODE MODIFIER MOBILITY: CM
DAILY ACTIVITIY SCORE: 6
DRESSING REGULAR LOWER BODY CLOTHING: TOTAL
PERSONAL GROOMING: TOTAL
MOVING TO AND FROM BED TO CHAIR: A LOT

## 2018-05-06 ASSESSMENT — PAIN SCALES - GENERAL
PAINLEVEL_OUTOF10: 0
PAINLEVEL_OUTOF10: 0

## 2018-05-06 NOTE — CARE PLAN
Problem: Skin Integrity  Goal: Risk for impaired skin integrity will decrease    Intervention: Assess and monitor skin integrity, appearance and/or temperature  Applied skin barrier per each changing stan pads.

## 2018-05-06 NOTE — PROGRESS NOTES
[facility] Hospitalist Progress Note    Date of Service: 5/6/2018    Chief Complaint  88 y.o. female with Hx of physical debility at baseline uses wheelchair for mobility, admitted 5/1/2018 with acute metabolic encephalopathy due to sepsis from influenza A as well as acute hypoxemia presumably from influenza.    Interval Problem Update  Clinically improving, patient more awake, and alert. Is complaining of cough and mild shortbess of breath.  Continue IV antibiotics.  Continue RT protocol, duo nebs, Pep therapy if warranted, and incentive spirometry.     5/3  Patient more awake today and alert, feeling better. Still has underlying dementia hence a poor historian. Overnight patient was found to be hypertensive and also complained of chest heavyness. EKG was obtained which was negative.  Continue IV antibiotics.    5/4  No acute issues, patient more alert but sleep most of day. Patient denies fevers/chills, chest pain, shortness of breath or nausea/vommiting.   Continue Tamiflu,   Placement after 7 days of being on tamiflu.  Continue RT protocol, duo nebs, Pep therapy if warranted, and incentive spirometry.   PT/OT in house    5/5  Patient comfortable, watching tv, no acute needs, denies chest pain  Cont tamiflu,   Placement after stopping tamiflu  Continue RT protocol, duo nebs, Pep therapy if warranted, and incentive spirometry.   Continue IV Ceftriaxone x 7 days for complicated uti.    5/6  No acute overnight events, patient comfortable. Patient keeps refusing to wear masks but otherwise doing well. Patient denies fevers/chills, chest pain, shortness of breath or nausea/vommiting.  She is also more alert and more conversive.  Continue tamiflu.  Continue RT protocol, duo nebs, Pep therapy if warranted, and incentive spirometry.   Pending placement once she finishes tamiflu.    Consultants/Specialty  None    Disposition  TBD        Review of Systems   Constitutional: Positive for malaise/fatigue. Negative for chills and  fever.   HENT: Negative for congestion, hearing loss, sore throat and tinnitus.    Eyes: Negative for blurred vision, double vision, photophobia and pain.   Respiratory: Negative for cough, hemoptysis, sputum production, shortness of breath and stridor.    Cardiovascular: Negative for chest pain, palpitations, orthopnea, claudication and PND.   Gastrointestinal: Negative for blood in stool, constipation, heartburn, melena, nausea and vomiting.   Genitourinary: Negative for dysuria, frequency and urgency.   Musculoskeletal: Negative for back pain, myalgias and neck pain.   Neurological: Positive for weakness. Negative for dizziness, tingling, tremors, sensory change, speech change and headaches.   Psychiatric/Behavioral: Negative for depression, memory loss and suicidal ideas. The patient is not nervous/anxious.       Physical Exam  Laboratory/Imaging   Hemodynamics  Temp (24hrs), Av.7 °C (98 °F), Min:36.4 °C (97.6 °F), Max:37.1 °C (98.7 °F)   Temperature: 37.1 °C (98.7 °F)  Pulse  Av.3  Min: 72  Max: 106    Blood Pressure : 124/99      Respiratory      Respiration: 16, Pulse Oximetry: 90 %        RUL Breath Sounds: Clear, RML Breath Sounds: Diminished, RLL Breath Sounds: Diminished, HUGH Breath Sounds: Clear, LLL Breath Sounds: Diminished    Fluids    Intake/Output Summary (Last 24 hours) at 18 0715  Last data filed at 18 0500   Gross per 24 hour   Intake              600 ml   Output                0 ml   Net              600 ml       Nutrition  Orders Placed This Encounter   Procedures   • DIET ORDER     Standing Status:   Standing     Number of Occurrences:   1     Order Specific Question:   Diet:     Answer:   Full Liquid [11]     Comments:   Crush meds in applesauce please     Order Specific Question:   Consistency/Fluid modifications:     Answer:   Nectar Thick [2]     Order Specific Question:   Miscellaneous modifications:     Answer:   SLP - 1:1 Supervision by Nursing [21]     Physical  Exam   Constitutional: She appears well-nourished. No distress.   HENT:   Head: Normocephalic and atraumatic.   Eyes: Conjunctivae are normal. Pupils are equal, round, and reactive to light. Right eye exhibits no discharge. Left eye exhibits no discharge.   Neck: Normal range of motion. Neck supple. No thyromegaly present.   Cardiovascular: Normal rate.    No murmur heard.  Pulmonary/Chest: Effort normal and breath sounds normal. No stridor. No respiratory distress. She has no wheezes. She has no rales.   Abdominal: Bowel sounds are normal. She exhibits no distension. There is no tenderness. There is no rebound.   Musculoskeletal: She exhibits no edema.   Neurological: She is alert. No cranial nerve deficit. Coordination normal.   Skin: Skin is warm and dry. She is not diaphoretic. No erythema.   Psychiatric: She has a normal mood and affect. Her behavior is normal.       Recent Labs      05/04/18   0735  05/05/18   0241  05/06/18   0217   WBC  13.3*  12.1*  13.3*   RBC  4.79  4.83  4.79   HEMOGLOBIN  15.0  15.4  15.2   HEMATOCRIT  44.5  45.1  44.0   MCV  92.9  93.4  91.9   MCH  31.3  31.9  31.7   MCHC  33.7  34.1  34.5   RDW  46.0  45.6  44.8   PLATELETCT  163*  176  170   MPV  9.9  10.4  10.7     Recent Labs      05/04/18   0735  05/05/18   0241  05/06/18   0217   SODIUM  137  139  135   POTASSIUM  3.1*  3.2*  3.7   CHLORIDE  102  102  101   CO2  26  26  28   GLUCOSE  137*  118*  104*   BUN  27*  26*  19   CREATININE  0.61  0.58  0.61   CALCIUM  9.1  9.0  9.2                      Assessment/Plan     * Influenza A   Assessment & Plan    Influenza A positive.  Continue course of tamiflu.        Acute respiratory failure with hypoxia (HCC)   Assessment & Plan    CXR: Pulmonary vascular congestion and probable minimal pulmonary edema.     Last echo in 2016 preserved LVEF 60%  Repeat echocardiogram LVEF 60%  Continue RT protocol, duo nebs, Pep therapy if warranted, and incentive spirometry.   At baseline now              Acute metabolic encephalopathy   Assessment & Plan    From sepsis, Influenza, and UTI.  At baseline? More awake.       UTI (urinary tract infection)   Assessment & Plan    + UA  Continue IV Ceftriaxone x 7 days   Blood and urine cultures negative.      Severe sepsis ([facility])   Assessment & Plan    This is severe sepsis with the following associated acute organ dysfunction(s): metabolic/septic encephalopathy.   Positive UTI however influenza A was also positive.  As above, continue IV abx.  Cultures neg.        Essential hypertension- (present on admission)   Assessment & Plan    Controlled   Continue Coreg, norvasc 5mg  IV hydralazine prn if sbp >180s.        RA (rheumatoid arthritis) ([facility])- (present on admission)   Assessment & Plan    Tylenol prn        Hyponatremia- (present on admission)   Assessment & Plan    2/2 to volume depletion  Resolved  CTM        Acquired hypothyroidism- (present on admission)   Assessment & Plan    Continue Synthroid        Dyslipidemia- (present on admission)   Assessment & Plan    Continue lovastatin     Patient plan of care discussed at multidisplinary team rounds and with patient and R.N at beside.       Quality-Core Measures   Reviewed items::  Labs reviewed, Radiology images reviewed, Medications reviewed and EKG reviewed  Thornton catheter::  No Thornton  DVT prophylaxis pharmacological::  Heparin  DVT prophylaxis - mechanical:  SCDs  Antibiotics:  Treating active infection/contamination beyond 24 hours perioperative coverage

## 2018-05-06 NOTE — PROGRESS NOTES
Report received, pt care assumed, tele box off (pt medical). VSS, pt assessment complete. Pt aaox3, no signs of distress noted at this time. Pt  and caregiver at bedside, refusing to wear masks even after thorough education about patient respiratory status with the Flu and need for droplet precautions They stated they are aware of patient having the flu, but still refusing to wear mask. Pt denies any additional needs at this time. Bed in lowest position, bed alarm on, pt educated on fall risk and verbalized understanding, call light within reach, will continue to monitor.

## 2018-05-06 NOTE — PROGRESS NOTES
"Assisted with her meal on full liq nectar thick, obtained about 50% on tray post breakfast,  Advises will put pillow on left side,but refuses too stated \" my back hurts\".  Lay flat on bed to relieve pressure on back.  "

## 2018-05-07 PROBLEM — D72.829 LEUKOCYTOSIS: Status: ACTIVE | Noted: 2018-05-07

## 2018-05-07 LAB
BASOPHILS # BLD AUTO: 0.3 % (ref 0–1.8)
BASOPHILS # BLD: 0.04 K/UL (ref 0–0.12)
EOSINOPHIL # BLD AUTO: 0.29 K/UL (ref 0–0.51)
EOSINOPHIL NFR BLD: 2 % (ref 0–6.9)
ERYTHROCYTE [DISTWIDTH] IN BLOOD BY AUTOMATED COUNT: 44.7 FL (ref 35.9–50)
HCT VFR BLD AUTO: 44.7 % (ref 37–47)
HGB BLD-MCNC: 15.3 G/DL (ref 12–16)
IMM GRANULOCYTES # BLD AUTO: 0.09 K/UL (ref 0–0.11)
IMM GRANULOCYTES NFR BLD AUTO: 0.6 % (ref 0–0.9)
LYMPHOCYTES # BLD AUTO: 3.95 K/UL (ref 1–4.8)
LYMPHOCYTES NFR BLD: 27.1 % (ref 22–41)
MCH RBC QN AUTO: 31.7 PG (ref 27–33)
MCHC RBC AUTO-ENTMCNC: 34.2 G/DL (ref 33.6–35)
MCV RBC AUTO: 92.5 FL (ref 81.4–97.8)
MONOCYTES # BLD AUTO: 0.93 K/UL (ref 0–0.85)
MONOCYTES NFR BLD AUTO: 6.4 % (ref 0–13.4)
NEUTROPHILS # BLD AUTO: 9.27 K/UL (ref 2–7.15)
NEUTROPHILS NFR BLD: 63.6 % (ref 44–72)
NRBC # BLD AUTO: 0 K/UL
NRBC BLD-RTO: 0 /100 WBC
PLATELET # BLD AUTO: 219 K/UL (ref 164–446)
PMV BLD AUTO: 10.7 FL (ref 9–12.9)
RBC # BLD AUTO: 4.83 M/UL (ref 4.2–5.4)
WBC # BLD AUTO: 14.6 K/UL (ref 4.8–10.8)

## 2018-05-07 PROCEDURE — 700102 HCHG RX REV CODE 250 W/ 637 OVERRIDE(OP): Performed by: HOSPITALIST

## 2018-05-07 PROCEDURE — 99232 SBSQ HOSP IP/OBS MODERATE 35: CPT | Performed by: HOSPITALIST

## 2018-05-07 PROCEDURE — 700102 HCHG RX REV CODE 250 W/ 637 OVERRIDE(OP): Performed by: INTERNAL MEDICINE

## 2018-05-07 PROCEDURE — A9270 NON-COVERED ITEM OR SERVICE: HCPCS | Performed by: HOSPITALIST

## 2018-05-07 PROCEDURE — 700111 HCHG RX REV CODE 636 W/ 250 OVERRIDE (IP): Performed by: INTERNAL MEDICINE

## 2018-05-07 PROCEDURE — 700111 HCHG RX REV CODE 636 W/ 250 OVERRIDE (IP): Performed by: HOSPITALIST

## 2018-05-07 PROCEDURE — 770021 HCHG ROOM/CARE - ISO PRIVATE

## 2018-05-07 PROCEDURE — 700111 HCHG RX REV CODE 636 W/ 250 OVERRIDE (IP): Performed by: FAMILY MEDICINE

## 2018-05-07 PROCEDURE — A9270 NON-COVERED ITEM OR SERVICE: HCPCS | Performed by: INTERNAL MEDICINE

## 2018-05-07 PROCEDURE — 36415 COLL VENOUS BLD VENIPUNCTURE: CPT

## 2018-05-07 PROCEDURE — 85025 COMPLETE CBC W/AUTO DIFF WBC: CPT

## 2018-05-07 RX ORDER — ONDANSETRON 4 MG/1
4 TABLET, ORALLY DISINTEGRATING ORAL EVERY 4 HOURS PRN
Status: DISCONTINUED | OUTPATIENT
Start: 2018-05-07 | End: 2018-05-08 | Stop reason: HOSPADM

## 2018-05-07 RX ADMIN — CARVEDILOL 6.25 MG: 6.25 TABLET, FILM COATED ORAL at 18:33

## 2018-05-07 RX ADMIN — CEFTRIAXONE SODIUM 1 G: 10 INJECTION, POWDER, FOR SOLUTION INTRAVENOUS at 10:24

## 2018-05-07 RX ADMIN — OSELTAMIVIR PHOSPHATE 75 MG: 75 CAPSULE ORAL at 00:17

## 2018-05-07 RX ADMIN — STANDARDIZED SENNA CONCENTRATE AND DOCUSATE SODIUM 2 TABLET: 8.6; 5 TABLET, FILM COATED ORAL at 18:33

## 2018-05-07 RX ADMIN — OSELTAMIVIR PHOSPHATE 75 MG: 75 CAPSULE ORAL at 13:01

## 2018-05-07 RX ADMIN — AMLODIPINE BESYLATE 5 MG: 5 TABLET ORAL at 07:46

## 2018-05-07 RX ADMIN — ONDANSETRON 4 MG: 2 INJECTION, SOLUTION INTRAMUSCULAR; INTRAVENOUS at 05:27

## 2018-05-07 RX ADMIN — LOVASTATIN 20 MG: 20 TABLET ORAL at 07:46

## 2018-05-07 RX ADMIN — LATANOPROST 1 DROP: 50 SOLUTION OPHTHALMIC at 18:34

## 2018-05-07 RX ADMIN — LEVOTHYROXINE SODIUM 88 MCG: 88 TABLET ORAL at 05:43

## 2018-05-07 RX ADMIN — CARVEDILOL 6.25 MG: 6.25 TABLET, FILM COATED ORAL at 07:46

## 2018-05-07 RX ADMIN — ENOXAPARIN SODIUM 40 MG: 100 INJECTION SUBCUTANEOUS at 07:46

## 2018-05-07 ASSESSMENT — ENCOUNTER SYMPTOMS
NECK PAIN: 0
PALPITATIONS: 0
CHILLS: 0
TREMORS: 0
WEAKNESS: 1
SENSORY CHANGE: 0
ORTHOPNEA: 0
NAUSEA: 0
MYALGIAS: 0
COUGH: 0
STRIDOR: 0
BACK PAIN: 0
MEMORY LOSS: 1
PND: 0
CONSTIPATION: 0
CLAUDICATION: 0
SHORTNESS OF BREATH: 0
BLOOD IN STOOL: 0
HEMOPTYSIS: 0
DIZZINESS: 0
HEADACHES: 0
SPUTUM PRODUCTION: 0
EYE PAIN: 0
DOUBLE VISION: 0
HEARTBURN: 0
BLURRED VISION: 0
VOMITING: 0
PHOTOPHOBIA: 0
SORE THROAT: 0
NERVOUS/ANXIOUS: 0
SPEECH CHANGE: 0
DEPRESSION: 0
TINGLING: 0
FEVER: 0

## 2018-05-07 ASSESSMENT — PAIN SCALES - GENERAL
PAINLEVEL_OUTOF10: 0

## 2018-05-07 NOTE — PROGRESS NOTES
Patient is nausea at this time. Patient refuses to take  Medication, except for thyroid medication.     Have rescheduled to medications to 0900  So we can attempt to admin medications.

## 2018-05-07 NOTE — CARE PLAN
Problem: Communication  Goal: The ability to communicate needs accurately and effectively will improve  Outcome: NOT MET  Encouraged pt to voice feelings regarding nausea or pain.    Problem: Safety  Goal: Will remain free from falls  Outcome: PROGRESSING AS EXPECTED  Verified pt's bed alarm is on.  Verified bed in lowest position and treaded slipper socks are on.      Problem: Knowledge Deficit  Goal: Knowledge of disease process/condition, treatment plan, diagnostic tests, and medications will improve  Outcome: PROGRESSING AS EXPECTED  Educated pt on indication and mechanism of action of all morning medication.  Informed pt of indication for new IV for antibiotic treatment for UTI.    Problem: Psychosocial Needs:  Goal: Level of anxiety will decrease  Outcome: PROGRESSING AS EXPECTED  Decreased pt's level of anxiety by reassuring pt that she is in a safe place and being treated for the flu. Pt states she would like to go home. Pt was reoriented to time and place as well as discharge destination to SNF.

## 2018-05-07 NOTE — PROGRESS NOTES
Report received at beside. RN assumed care. Chart reviewed. Patient is resting in bed. Patient updated on plan of care regarding.  Assessment completed. AO x2 to self and situation, pt reoriented to time and place. Pt states no pain but is nauseous. Pt has been provided breakfast. Bed alarm verified on. Call light within reach. Bed in lowest position. Non slip socks on.

## 2018-05-07 NOTE — CARE PLAN
Problem: Safety  Goal: Will remain free from injury  Outcome: PROGRESSING AS EXPECTED  Rn educated patient on safety and fall precautions. Patient verbalized understanding of education    Problem: Infection  Goal: Will remain free from infection  Outcome: PROGRESSING AS EXPECTED  Rn educated patient on safety and fall precautions. Patient verbalized understanding of education

## 2018-05-07 NOTE — PROGRESS NOTES
Another rn went to attempt to start an IV. Patient refused.    Patient refused medications at this time

## 2018-05-07 NOTE — PROGRESS NOTES
"Patient stated \" she wants to go home home not to another facility\"  Patient is asking for discharge papers to be completed in the morning so that she can go home  "

## 2018-05-07 NOTE — DISCHARGE PLANNING
Anticipated Discharge Disposition: Home vs SNF    Action: LSW left VM for pt caregiver Radha about dc plan. LSW spoke with [facility] insurance auth and as of now there is no skilled need for pt due to pt being unable to participate in therapies. Pt has 24/7 caregivers at home with hospital bed etc.     Barriers to Discharge: Pt has no SNF need due to unable to participate in therapies.     Plan: f/u with caregivers about taking pt home with possible HH.

## 2018-05-07 NOTE — PROGRESS NOTES
Patient's IV infiltrated. Patient refused twice to let  Nurses attempt to get a new IV. Patient educated on the need for IV, patient still refused

## 2018-05-07 NOTE — PROGRESS NOTES
[facility] Hospitalist Progress Note    Date of Service: 5/7/2018    Chief Complaint  88 y.o. female with Hx of physical debility at baseline uses wheelchair for mobility, admitted 5/1/2018 with acute metabolic encephalopathy due to sepsis from influenza A as well as acute hypoxemia presumably from influenza.    Interval Problem Update  Clinically improving, patient more awake, and alert. Is complaining of cough and mild shortbess of breath.  Continue IV antibiotics.  Continue RT protocol, duo nebs, Pep therapy if warranted, and incentive spirometry.     5/3  Patient more awake today and alert, feeling better. Still has underlying dementia hence a poor historian. Overnight patient was found to be hypertensive and also complained of chest heavyness. EKG was obtained which was negative.  Continue IV antibiotics.    5/4  No acute issues, patient more alert but sleep most of day. Patient denies fevers/chills, chest pain, shortness of breath or nausea/vommiting.   Continue Tamiflu,   Placement after 7 days of being on tamiflu.  Continue RT protocol, duo nebs, Pep therapy if warranted, and incentive spirometry.   PT/OT in house    5/5  Patient comfortable, watching tv, no acute needs, denies chest pain  Cont tamiflu,   Placement after stopping tamiflu  Continue RT protocol, duo nebs, Pep therapy if warranted, and incentive spirometry.   Continue IV Ceftriaxone x 7 days for complicated uti.    5/6  No acute overnight events, patient comfortable. Patient keeps refusing to wear masks but otherwise doing well. Patient denies fevers/chills, chest pain, shortness of breath or nausea/vommiting.  She is also more alert and more conversive.  Continue tamiflu.  Continue RT protocol, duo nebs, Pep therapy if warranted, and incentive spirometry.   Pending placement once she finishes tamiflu.    5/7  No issues, patient stable, lying in bed. AAox3.   Completing course of tamiflu, but needs to be here for 2 additional days after completing  tamiflu to be accepted to SNF.      Consultants/Specialty  None    Disposition  TBD        Review of Systems   Constitutional: Negative for chills, fever and malaise/fatigue.   HENT: Negative for congestion, hearing loss, sore throat and tinnitus.    Eyes: Negative for blurred vision, double vision, photophobia and pain.   Respiratory: Negative for cough, hemoptysis, sputum production, shortness of breath and stridor.    Cardiovascular: Negative for chest pain, palpitations, orthopnea, claudication and PND.   Gastrointestinal: Negative for blood in stool, constipation, heartburn, melena, nausea and vomiting.   Genitourinary: Negative for dysuria, frequency and urgency.   Musculoskeletal: Negative for back pain, myalgias and neck pain.   Neurological: Positive for weakness. Negative for dizziness, tingling, tremors, sensory change, speech change and headaches.   Psychiatric/Behavioral: Positive for memory loss. Negative for depression and suicidal ideas. The patient is not nervous/anxious.       Physical Exam  Laboratory/Imaging   Hemodynamics  Temp (24hrs), Av.8 °C (98.3 °F), Min:36.2 °C (97.1 °F), Max:37.3 °C (99.1 °F)   Temperature: 37.3 °C (99.1 °F)  Pulse  Av  Min: 72  Max: 106    Blood Pressure : 139/74      Respiratory      Respiration: 17, Pulse Oximetry: 90 %        RUL Breath Sounds: Clear, RML Breath Sounds: Diminished, RLL Breath Sounds: Diminished, HUGH Breath Sounds: Clear, LLL Breath Sounds: Diminished    Fluids    Intake/Output Summary (Last 24 hours) at 18 0718  Last data filed at 18 2000   Gross per 24 hour   Intake              400 ml   Output                0 ml   Net              400 ml       Nutrition  Orders Placed This Encounter   Procedures   • DIET ORDER     Standing Status:   Standing     Number of Occurrences:   1     Order Specific Question:   Diet:     Answer:   Full Liquid [11]     Comments:   Crush meds in applesauce please     Order Specific Question:    Consistency/Fluid modifications:     Answer:   Nectar Thick [2]     Order Specific Question:   Miscellaneous modifications:     Answer:   SLP - 1:1 Supervision by Nursing [21]     Physical Exam   Constitutional: She appears well-nourished. No distress.   HENT:   Head: Normocephalic and atraumatic.   Mouth/Throat: No oropharyngeal exudate.   Eyes: Conjunctivae are normal. Pupils are equal, round, and reactive to light. Right eye exhibits no discharge. Left eye exhibits no discharge.   Neck: Normal range of motion. Neck supple. No JVD present. No thyromegaly present.   Cardiovascular: Normal rate and intact distal pulses.    No murmur heard.  Pulmonary/Chest: Effort normal and breath sounds normal. No stridor. No respiratory distress. She has no wheezes. She has no rales.   Abdominal: Bowel sounds are normal. She exhibits no distension. There is no tenderness. There is no rebound.   Musculoskeletal: She exhibits no edema.   Neurological: She is alert. No cranial nerve deficit. Coordination normal.   Skin: Skin is warm and dry. She is not diaphoretic. No erythema.   Psychiatric: She has a normal mood and affect. Her behavior is normal. Thought content normal.       Recent Labs      05/05/18   0241  05/06/18   0217  05/07/18   0143   WBC  12.1*  13.3*  14.6*   RBC  4.83  4.79  4.83   HEMOGLOBIN  15.4  15.2  15.3   HEMATOCRIT  45.1  44.0  44.7   MCV  93.4  91.9  92.5   MCH  31.9  31.7  31.7   MCHC  34.1  34.5  34.2   RDW  45.6  44.8  44.7   PLATELETCT  176  170  219   MPV  10.4  10.7  10.7     Recent Labs      05/04/18   0735  05/05/18   0241  05/06/18   0217   SODIUM  137  139  135   POTASSIUM  3.1*  3.2*  3.7   CHLORIDE  102  102  101   CO2  26  26  28   GLUCOSE  137*  118*  104*   BUN  27*  26*  19   CREATININE  0.61  0.58  0.61   CALCIUM  9.1  9.0  9.2                      Assessment/Plan     Acquired hypothyroidism  Continue Synthroid    Acute metabolic encephalopathy  From sepsis, Influenza, and UTI.  At baseline?  More awake.    Dyslipidemia  Continue lovastatin    Essential hypertension  Blood pressures running in the 140s to 170s  Continue Coreg 6.25 twice a day  At 2nd medication if needed  . Hydralazine    Hyponatremia  Likely volume depletion, infection  Normal saline, gentle  Repeated in the morning    Influenza A  Rapid flu was positive  Contact isolation  Initiate Tamiflu    RA (rheumatoid arthritis) ([facility])  Tylenol when necessary    Severe sepsis ([facility])  This is severe sepsis with the following associated acute organ dysfunction(s): metabolic/septic encephalopathy.   She appears to have a UTI however influenza A was also positive.  -Follow-up urine culture, ceftriaxone  -Tamiflu  -Follow all cultures  -IV hydration, use caution in the setting of mild pulmonary edema    UTI (urinary tract infection)  Follow UA, empiric ceftriaxone    Acute respiratory failure with hypoxia ([facility])  CXR: Pulmonary vascular congestion and probable minimal pulmonary edema.     Last echo in 2016 preserved LVEF 60%  Repeat echocardiogram LVEF 60%  Continue RT protocol, duo nebs, Pep therapy if warranted, and incentive spirometry.   At baseline now       Leukocytosis  Suspect reactive, on abx and tamiflu.  No diarrha noted. No fevers, vitals stable  CTM    Patient plan of care discussed at multidisplinary team rounds and with patient and R.N at [facility].    Quality-Core Measures   Reviewed items::  Labs reviewed, Radiology images reviewed, Medications reviewed and EKG reviewed  Thornton catheter::  No Thornton  DVT prophylaxis pharmacological::  Heparin  DVT prophylaxis - mechanical:  SCDs  Antibiotics:  Treating active infection/contamination beyond 24 hours perioperative coverage

## 2018-05-07 NOTE — PROGRESS NOTES
Bedside report received. Patient denies any needs at this time. No s/s of distress. Patient instructed to call for assistance. Patient's bed in lowest position,  Bed alarm active and call light within reach

## 2018-05-08 ENCOUNTER — HOME HEALTH ADMISSION (OUTPATIENT)
Dept: HOME HEALTH SERVICES | Facility: HOME HEALTHCARE | Age: 83
End: 2018-05-08
Payer: MEDICARE

## 2018-05-08 VITALS
TEMPERATURE: 99.5 F | DIASTOLIC BLOOD PRESSURE: 87 MMHG | RESPIRATION RATE: 16 BRPM | WEIGHT: 194 LBS | HEIGHT: 66 IN | HEART RATE: 82 BPM | BODY MASS INDEX: 31.18 KG/M2 | OXYGEN SATURATION: 89 % | SYSTOLIC BLOOD PRESSURE: 138 MMHG

## 2018-05-08 PROBLEM — D72.829 LEUKOCYTOSIS: Status: RESOLVED | Noted: 2018-05-07 | Resolved: 2018-05-08

## 2018-05-08 PROBLEM — N39.0 UTI (URINARY TRACT INFECTION): Status: RESOLVED | Noted: 2018-05-01 | Resolved: 2018-05-08

## 2018-05-08 PROBLEM — A41.9 SEVERE SEPSIS (HCC): Status: RESOLVED | Noted: 2018-05-01 | Resolved: 2018-05-08

## 2018-05-08 PROBLEM — R65.20 SEVERE SEPSIS (HCC): Status: RESOLVED | Noted: 2018-05-01 | Resolved: 2018-05-08

## 2018-05-08 PROBLEM — G93.41 ACUTE METABOLIC ENCEPHALOPATHY: Status: RESOLVED | Noted: 2018-05-01 | Resolved: 2018-05-08

## 2018-05-08 PROBLEM — J96.01 ACUTE RESPIRATORY FAILURE WITH HYPOXIA (HCC): Status: RESOLVED | Noted: 2018-05-01 | Resolved: 2018-05-08

## 2018-05-08 PROCEDURE — 700102 HCHG RX REV CODE 250 W/ 637 OVERRIDE(OP): Performed by: HOSPITALIST

## 2018-05-08 PROCEDURE — A9270 NON-COVERED ITEM OR SERVICE: HCPCS | Performed by: INTERNAL MEDICINE

## 2018-05-08 PROCEDURE — A9270 NON-COVERED ITEM OR SERVICE: HCPCS | Performed by: HOSPITALIST

## 2018-05-08 PROCEDURE — 700102 HCHG RX REV CODE 250 W/ 637 OVERRIDE(OP): Performed by: INTERNAL MEDICINE

## 2018-05-08 PROCEDURE — 97535 SELF CARE MNGMENT TRAINING: CPT

## 2018-05-08 PROCEDURE — 700111 HCHG RX REV CODE 636 W/ 250 OVERRIDE (IP): Performed by: INTERNAL MEDICINE

## 2018-05-08 PROCEDURE — 99239 HOSP IP/OBS DSCHRG MGMT >30: CPT | Performed by: INTERNAL MEDICINE

## 2018-05-08 RX ORDER — AMLODIPINE BESYLATE 5 MG/1
5 TABLET ORAL DAILY
Qty: 30 TAB | Refills: 1 | Status: SHIPPED | OUTPATIENT
Start: 2018-05-09

## 2018-05-08 RX ORDER — SODIUM CHLORIDE 9 MG/ML
INJECTION, SOLUTION INTRAVENOUS
Status: DISCONTINUED
Start: 2018-05-08 | End: 2018-05-08 | Stop reason: HOSPADM

## 2018-05-08 RX ADMIN — OSELTAMIVIR PHOSPHATE 75 MG: 75 CAPSULE ORAL at 00:34

## 2018-05-08 RX ADMIN — LEVOTHYROXINE SODIUM 88 MCG: 88 TABLET ORAL at 06:01

## 2018-05-08 RX ADMIN — AMLODIPINE BESYLATE 5 MG: 5 TABLET ORAL at 06:02

## 2018-05-08 RX ADMIN — LOVASTATIN 20 MG: 20 TABLET ORAL at 06:01

## 2018-05-08 RX ADMIN — CARVEDILOL 6.25 MG: 6.25 TABLET, FILM COATED ORAL at 09:05

## 2018-05-08 RX ADMIN — ENOXAPARIN SODIUM 40 MG: 100 INJECTION SUBCUTANEOUS at 06:02

## 2018-05-08 ASSESSMENT — PAIN SCALES - GENERAL: PAINLEVEL_OUTOF10: 0

## 2018-05-08 NOTE — PROGRESS NOTES
Discharge instructions and medications reviewed with pt's  and daughter- both verbalized understanding. Pt in possession of all of her belongings. Wheeled out for d/c by RADHA [place].

## 2018-05-08 NOTE — PROGRESS NOTES
Patient denies any needs at this time. Patient  Stated she just wants to go home.    Patient refused SCD. Patient reoriented to the hospital and plan of care for her. Patient instructed to call for assistance. Patient's bed in lowest position, bed alarm active and call light within reach

## 2018-05-08 NOTE — PROGRESS NOTES
Beside report received. No s/s of distress. Patient was resting. Patient's bed in lowest position, bed alarm active and call light within reach

## 2018-05-08 NOTE — DISCHARGE PLANNING
Anticipated Discharge Disposition: Home health     Action: LSW spoke with pt caretaker Radha who reports pt was previously with [facility] and would like to continue services. LSW faxed HH choice to [facility] .    Barriers to Discharge: None    Plan: Family to come get pt around 3:00 p.m. Today. Family will be bringing their own WC.

## 2018-05-08 NOTE — DISCHARGE PLANNING
Received choice form from [facility] for patients home health services, referral has been sent to [facility]  per patient request. Received choice at [phone], referral sent at [phone].

## 2018-05-08 NOTE — PROGRESS NOTES
Received bedside report from RADHA [place]. Pt resting in bed. No complaints at this time. Will continue to monitor.

## 2018-05-08 NOTE — FACE TO FACE
Face to Face Supporting Documentation - Home Health    The encounter with this patient was in whole or in part the primary reason for home health admission.    Date of encounter:   Patient:                    MRN:                       YOB: 2018  Bing Bernal  [id number]  3/3/1930     Home health to see patient for:  Skilled Nursing care for assessment, interventions & education and Speech Language Pathology evaluation and treatment, Physical therapy evaluation and treatment, occupational therapy evaluation and treatment    Skilled need for:  Recent Deterioration of Health Status influenza    Skilled nursing interventions to include:  Comment: medication management    Homebound status evidenced by:  Need the aid of supportive devices such as crutches, canes, wheelchairs or walkers or Needs the assistance of another person in order to leave the home. Leaving home requires a considerable and taxing effort. There is a normal inability to leave the home.    Community Physician to provide follow up care: Miquel Donald M.D.     Optional Interventions? No      I certify the face to face encounter for this home health care referral meets the CMS requirements and the encounter/clinical assessment with the patient was, in whole, or in part, for the medical condition(s) listed above, which is the primary reason for home health care. Based on my clinical findings: the service(s) are medically necessary, support the need for home health care, and the homebound criteria are met.  I certify that this patient has had a face to face encounter by myself.  Maisha Diaz M.D. - NPI: [id number]

## 2018-05-08 NOTE — DISCHARGE INSTRUCTIONS
Discharge Instructions    Discharged to home by car with relative. Discharged via wheelchair, hospital escort: Yes.  Special equipment needed: Not Applicable    Be sure to schedule a follow-up appointment with your primary care doctor or any specialists as instructed.     Discharge Plan:   Influenza Vaccine Indication: Not indicated: Previously immunized this influenza season and > 8 years of age    I understand that a diet low in cholesterol, fat, and sodium is recommended for good health. Unless I have been given specific instructions below for another diet, I accept this instruction as my diet prescription.   Other diet:     Special Instructions: None    · Is patient discharged on Warfarin / Coumadin?   No     Depression / Suicide Risk    As you are discharged from this [facility] facility, it is important to learn how to keep safe from harming yourself.    Recognize the warning signs:  · Abrupt changes in personality, positive or negative- including increase in energy   · Giving away possessions  · Change in eating patterns- significant weight changes-  positive or negative  · Change in sleeping patterns- unable to sleep or sleeping all the time   · Unwillingness or inability to communicate  · Depression  · Unusual sadness, discouragement and loneliness  · Talk of wanting to die  · Neglect of personal appearance   · Rebelliousness- reckless behavior  · Withdrawal from people/activities they love  · Confusion- inability to concentrate     If you or a loved one observes any of these behaviors or has concerns about self-harm, here's what you can do:  · Talk about it- your feelings and reasons for harming yourself  · Remove any means that you might use to hurt yourself (examples: pills, rope, extension cords, firearm)  · Get professional help from the community (Mental Health, Substance Abuse, psychological counseling)  · Do not be alone:Call your Safe Contact- someone whom you trust who will be there for  you.  · Call your local CRISIS HOTLINE 837-9602 or 887-786-6701  · Call your local Children's Mobile Crisis Response Team [place] (246) 591-7635 or www.[employer]  · Call the toll free National Suicide Prevention Hotlines   · National Suicide Prevention Lifeline 899-238-LCXX (5721)  · National Hope Line Network 800-SUICIDE (485-8493)    Kidney Disease, Adult  The kidneys are two organs that lie on either side of the spine between the middle of the back and the front of the abdomen. The kidneys:   · Remove wastes and extra water from the blood.    · Produce important hormones. These regulate blood pressure, help keep bones strong, and help create red blood cells.    · Balance the fluids and chemicals in the blood and tissues.  Kidney disease occurs when the kidneys are damaged. Kidney damage may be sudden (acute) or develop over a long period (chronic). A small amount of damage may not cause problems, but a large amount of damage may make it difficult or impossible for the kidneys to work the way they should. Early detection and treatment of kidney disease may prevent kidney damage from becoming permanent or getting worse. Some kidney diseases are curable, but most are not. Many people with kidney disease are able to control the disease and live a normal life.   TYPES OF KIDNEY DISEASE  · Acute kidney injury. Acute kidney injury occurs when there is sudden damage to the kidneys.  · Chronic kidney disease. Chronic kidney disease occurs when the kidneys are damaged over a long period.  · End-stage kidney disease. End-stage kidney disease occurs when the kidneys are so damaged that they stop working. In end-stage kidney disease, the kidneys cannot get better.  CAUSES  Any condition, disease, or event that damages the kidneys may cause kidney disease.  Acute kidney injury.  · A problem with blood flow to the kidneys. This may be caused by:    · Blood loss.    · Heart disease.    · Severe burns.    · Liver  disease.  · Direct damage to the kidneys. This may be caused by:  · Some medicines.    · A kidney infection.    · Poisoning or consuming toxic substances.    · A surgical wound.    · A blow to the kidney area.    · A problem with urine flow. This may be caused by:    · Cancer.    · Kidney stones.    · An enlarged prostate.  Chronic kidney disease. The most common causes of chronic kidney disease are diabetes and high blood pressure (hypertension). Chronic kidney disease may also be caused by:   · Diseases that cause the filtering units of the kidneys to become inflamed.    · Diseases that affect the immune system.    · Genetic diseases.    · Medicines that damage the kidneys, such as anti-inflammatory medicines.    · Poisoning or exposure to toxic substances.    · A reoccurring kidney or urinary infection.    · A problem with urine flow. This may be caused by:  · Cancer.    · Kidney stones.    · An enlarged prostate in males.  End-stage kidney disease. This kidney disease usually occurs when a chronic kidney disease gets worse. It may also occur after acute kidney injury.   SYMPTOMS   · Swelling (edema) of the legs, ankles, or feet.    · Tiredness (lethargy).    · Nausea or vomiting.    · Confusion.    · Problems with urination, such as:    · Painful or burning feeling during urination.    · Decreased urine production.  · Bloody urine.    · Frequent urination, especially at night.  · Hypertension.   · Muscle twitches and cramps.    · Shortness of breath.    · Persistent itchiness.    · Loss of appetite.  · Metallic taste in the mouth.    · Weakness.    · Seizures.    · Chest pain or pressure.    · Trouble sleeping.    · Headaches.    · Abnormally dark or light skin.    · Numbness in the hands or feet.    · Easy bruising.    · Frequent hiccups.    · Menstruation stops.  Sometimes, no symptoms are present.    DIAGNOSIS   Kidney disease may be detected and diagnosed by tests, including blood, urine, imaging, or kidney  biopsy tests.   TREATMENT   Acute kidney injury. Treatment of acute kidney injury varies depending on the cause and severity of the kidney damage. In mild cases, no treatment may be needed. The kidneys may heal on their own. If acute kidney injury is more severe, your caregiver will treat the cause of the kidney damage, help the kidneys heal, and prevent complications from occurring. Severe cases may require a procedure to remove toxic wastes from the body (dialysis) or surgery to repair kidney damage. Surgery may involve:   · Repair of a torn kidney.    · Removal of an obstruction.    Most of the time, you will need to stay overnight at the hospital.   Chronic kidney disease. Most chronic kidney diseases cannot be cured. Treatment usually involves relieving symptoms and preventing or slowing the progression of the disease. Treatment may include:   · A special diet. You may need to avoid alcohol and foods that:    · Have added salt.    · Are high in potassium.    · Are high in protein.    · Medicines. These may:    · Lower blood pressure.    · Relieve anemia.    · Relieve swelling.    · Protect the bones.    End-stage kidney disease. End-stage kidney disease is life-threatening and must be treated immediately. There are two treatments for end-stage kidney disease:   · Dialysis.    · Receiving a new kidney (kidney transplant).  Both of these treatments have serious risks and consequences. In addition to having dialysis or a kidney transplant, you may need to take medicines to control hypertension and cholesterol and to decrease phosphorus levels in your blood.  LENGTH OF ILLNESS  · Acute kidney injury. The length of this disease varies greatly from person to person. Exactly how long it lasts depends on the cause of the kidney damage. Acute kidney injury may develop into chronic kidney disease or end-stage kidney disease.  · Chronic kidney disease. This disease usually lasts a lifetime. Chronic kidney disease may  worsen over time to become end-stage kidney disease. The time it takes for end-stage kidney disease to develop varies from person to person.  · End-stage kidney disease. This disease lasts until a kidney transplant is performed.  PREVENTION   Kidney disease can sometimes be prevented. If you have diabetes, hypertension, or any other condition that may lead to kidney disease, you should try to prevent kidney disease with:   · An appropriate diet.  · Medicine.  · Lifestyle changes.  FOR MORE INFORMATION   American Association of Kidney Patients: www.aakp.org   National Kidney Foundation: www.kidney.org   American Kidney Fund: www.akfinc.org   Life Options Rehabilitation Program: www.lifeoptions.org and www.kidneyschool.org   Document Released: 12/18/2006 Document Revised: 12/04/2013 Document Reviewed: 08/16/2013  [employer]® Patient Information ©2014 [employer].

## 2018-05-08 NOTE — DISCHARGE PLANNING
ATTN: Case Management  RE: Referral for Home Health                We would like to take this opportunity to thank you for submitting a referral for your patient to continue care with [facility]. Our skilled team is dedicated to helping all patients recover and gain independence in the home setting.            As of 5/8/18, we have accepted the above patient into our service. A [facility] clinician will be out to see the patient within 48 hours to conduct our initial visit. If you have any questions or concerns regarding the patient’s transition to Home Health, please do not hesitate to contact us. We are open for referrals 7 days a week from 8AM to 5PM at [phone].      We look forward to collaborating with you,  [facility] Team

## 2018-05-08 NOTE — CARE PLAN
Problem: Safety  Goal: Will remain free from injury  Outcome: PROGRESSING SLOWER THAN EXPECTED  Rn educated patient on safety and fall precautions. Patient shows  No evidence of learning , patient needs education reinforcement    Problem: Infection  Goal: Will remain free from infection  Outcome: PROGRESSING SLOWER THAN EXPECTED  rn educated patient on infection control and precautions. Patient shows no evidence of learning, patient needs education reinforcement

## 2018-05-09 ENCOUNTER — TELEPHONE (OUTPATIENT)
Dept: MEDICAL GROUP | Facility: MEDICAL CENTER | Age: 83
End: 2018-05-09

## 2018-05-09 ENCOUNTER — PATIENT OUTREACH (OUTPATIENT)
Dept: HEALTH INFORMATION MANAGEMENT | Facility: OTHER | Age: 83
End: 2018-05-09

## 2018-05-09 NOTE — TELEPHONE ENCOUNTER
1. Caller Name: Bing                      Call Back Number: [phone] (home)       2. Message: Patient just got discharged from the hospital,  is stating she is to be on a blood thinner, there is nothing on the medication list so he wanted you to see if you can prescribe what they gave her? He does have an appt 5/18 with Randall.     3. Patient approves office to leave a detailed voicemail/MyChart message: N\A

## 2018-05-09 NOTE — DISCHARGE SUMMARY
CHIEF COMPLAINT ON ADMISSION  Chief Complaint   Patient presents with   • Weakness       CODE STATUS  Prior    HPI & HOSPITAL COURSE  This is a 88 y.o. female here with AMS and hypoxia due to influenza A.    Patient presented with cough and AMS. She was found with severe sepsis due to influenza A with acute hypoxia. She was also found with possible UTI. She completed course of tamilfu and 7 days of ceftriaxone. Blood cultures were negative. BNP was mildly elevated at 200s, TTE showed normal EF. She received gentle diuresis and weaned off O2.   Her mentation improved, continued to have some periods of delrium which I except to continue to improve. SNF was recommended to the family, but they elected to have the patient home.   She was discharged with home health ST, PT , OT, and skilled nursing.    The patient met 2-midnight criteria for an inpatient stay at the time of discharge.    Therefore, she is discharged in good and stable condition with close outpatient follow-up.    SPECIFIC OUTPATIENT FOLLOW-UP  PCP    DISCHARGE PROBLEM LIST  Principal Problem:    Influenza A POA: Yes  Active Problems:    Dyslipidemia POA: Yes    Acquired hypothyroidism POA: Yes    RA (rheumatoid arthritis) (HCC) POA: Yes    Essential hypertension POA: Yes  Resolved Problems:    Hyponatremia POA: Yes    Severe sepsis (HCC) POA: Unknown    UTI (urinary tract infection) POA: Unknown    Acute metabolic encephalopathy POA: Unknown    Acute respiratory failure with hypoxia (HCC) POA: Unknown    Leukocytosis POA: Unknown      FOLLOW UP  Future Appointments  Date Time Provider Department Center   9/5/2018 4:20 PM Miquel Donald M.D. [facility] LATANYA Donald M.D.  [address]  [facility] [address]  [facility] [id number]  [phone]    On 5/8/2018        MEDICATIONS ON DISCHARGE   Bing Bernal   Home Medication Instructions ELENA:[id number]    Printed on:05/08/18 1917   Medication Information                      amLODIPine (NORVASC) 5 MG  Tab  Take 1 Tab by mouth every day.             carvedilol (COREG) 6.25 MG Tab  Take 1 Tab by mouth 2 times a day, with meals.             cyanocobalamin (VITAMIN B-12) 1000 MCG/ML Solution  1 mL by Intramuscular route every 30 days.             gabapentin (NEURONTIN) 400 MG Cap  Take 1 Cap by mouth 3 times a day.             latanoprost (XALATAN) 0.005 % Solution  Place 1 Drop in both eyes every evening.             levothyroxine (SYNTHROID) 88 MCG Tab  Take 1 Tab by mouth Every morning on an empty stomach.             lovastatin (MEVACOR) 20 MG Tab  Take 1 Tab by mouth every day.             vitamin D 2000 UNIT Tab  Take 1 Tab by mouth every day.                 DIET  No orders of the defined types were placed in this encounter.      ACTIVITY  As tolerated.  Weight bearing as tolerated      CONSULTATIONS  None    PROCEDURES  None    LABORATORY  Lab Results   Component Value Date/Time    SODIUM 135 05/06/2018 02:17 AM    POTASSIUM 3.7 05/06/2018 02:17 AM    CHLORIDE 101 05/06/2018 02:17 AM    CO2 28 05/06/2018 02:17 AM    GLUCOSE 104 (H) 05/06/2018 02:17 AM    BUN 19 05/06/2018 02:17 AM    CREATININE 0.61 05/06/2018 02:17 AM    CREATININE 1.0 11/19/2007 12:20 PM        Lab Results   Component Value Date/Time    WBC 14.6 (H) 05/07/2018 01:43 AM    HEMOGLOBIN 15.3 05/07/2018 01:43 AM    HEMATOCRIT 44.7 05/07/2018 01:43 AM    PLATELETCT 219 05/07/2018 01:43 AM        Total time of the discharge process exceeds 40 minutes

## 2018-05-10 ENCOUNTER — HOME CARE VISIT (OUTPATIENT)
Dept: HOME HEALTH SERVICES | Facility: HOME HEALTHCARE | Age: 83
End: 2018-05-10
Payer: MEDICARE

## 2018-05-10 ENCOUNTER — TELEPHONE (OUTPATIENT)
Dept: HEALTH INFORMATION MANAGEMENT | Facility: OTHER | Age: 83
End: 2018-05-10

## 2018-05-10 VITALS
RESPIRATION RATE: 18 BRPM | HEART RATE: 68 BPM | DIASTOLIC BLOOD PRESSURE: 86 MMHG | TEMPERATURE: 98.6 F | OXYGEN SATURATION: 97 % | SYSTOLIC BLOOD PRESSURE: 120 MMHG | BODY MASS INDEX: 34.02 KG/M2 | HEIGHT: 63 IN | WEIGHT: 192 LBS

## 2018-05-10 PROCEDURE — 665001 SOC-HOME HEALTH

## 2018-05-10 PROCEDURE — G0493 RN CARE EA 15 MIN HH/HOSPICE: HCPCS

## 2018-05-10 SDOH — ECONOMIC STABILITY: HOUSING INSECURITY: UNSAFE APPLIANCES: 0

## 2018-05-10 SDOH — ECONOMIC STABILITY: HOUSING INSECURITY: UNSAFE COOKING RANGE AREA: 0

## 2018-05-10 ASSESSMENT — PATIENT HEALTH QUESTIONNAIRE - PHQ9
2. FEELING DOWN, DEPRESSED, IRRITABLE, OR HOPELESS: 00
1. LITTLE INTEREST OR PLEASURE IN DOING THINGS: 00

## 2018-05-10 ASSESSMENT — ACTIVITIES OF DAILY LIVING (ADL)
HOME_HEALTH_OASIS: 01
OASIS_M1830: 06

## 2018-05-10 ASSESSMENT — ENCOUNTER SYMPTOMS
NAUSEA: DENIES
VOMITING: DENIES

## 2018-05-10 NOTE — TELEPHONE ENCOUNTER
Received referral from [facility]. Medications reviewed against discharge summary. No clinically significant interactions or medication issues noted.     Coby Huntley, ElzaD

## 2018-05-12 ENCOUNTER — HOME CARE VISIT (OUTPATIENT)
Dept: HOME HEALTH SERVICES | Facility: HOME HEALTHCARE | Age: 83
End: 2018-05-12
Payer: MEDICARE

## 2018-05-12 VITALS
HEART RATE: 80 BPM | RESPIRATION RATE: 16 BRPM | OXYGEN SATURATION: 94 % | SYSTOLIC BLOOD PRESSURE: 118 MMHG | TEMPERATURE: 98.3 F | DIASTOLIC BLOOD PRESSURE: 74 MMHG

## 2018-05-12 PROCEDURE — G0151 HHCP-SERV OF PT,EA 15 MIN: HCPCS

## 2018-05-12 ASSESSMENT — ACTIVITIES OF DAILY LIVING (ADL)
IADLS_COMMENTS: <!--EPICS-->SEE OT EVAL<!--EPICE-->
ADLS_COMMENTS: <!--EPICS-->SEE OT EVAL<!--EPICE-->

## 2018-05-14 ENCOUNTER — HOME CARE VISIT (OUTPATIENT)
Dept: HOME HEALTH SERVICES | Facility: HOME HEALTHCARE | Age: 83
End: 2018-05-14
Payer: MEDICARE

## 2018-05-14 VITALS
OXYGEN SATURATION: 96 % | TEMPERATURE: 98.8 F | DIASTOLIC BLOOD PRESSURE: 79 MMHG | SYSTOLIC BLOOD PRESSURE: 130 MMHG | HEART RATE: 92 BPM | RESPIRATION RATE: 17 BRPM

## 2018-05-14 PROCEDURE — G0152 HHCP-SERV OF OT,EA 15 MIN: HCPCS

## 2018-05-15 ENCOUNTER — HOME CARE VISIT (OUTPATIENT)
Dept: HOME HEALTH SERVICES | Facility: HOME HEALTHCARE | Age: 83
End: 2018-05-15
Payer: MEDICARE

## 2018-05-15 VITALS
DIASTOLIC BLOOD PRESSURE: 84 MMHG | HEART RATE: 82 BPM | SYSTOLIC BLOOD PRESSURE: 154 MMHG | TEMPERATURE: 97.5 F | RESPIRATION RATE: 16 BRPM | OXYGEN SATURATION: 98 %

## 2018-05-15 PROCEDURE — G0496 LPN CARE TRAIN/EDU IN HH: HCPCS

## 2018-05-15 PROCEDURE — G0151 HHCP-SERV OF PT,EA 15 MIN: HCPCS

## 2018-05-15 SDOH — ECONOMIC STABILITY: HOUSING INSECURITY: UNSAFE APPLIANCES: 0

## 2018-05-15 SDOH — ECONOMIC STABILITY: HOUSING INSECURITY
HOME SAFETY: PT DOES LIMITED TO BED BATHS ONLY, WEARS INCONTINENCE BRIEF. PT/HUSBAND DO NOT WANT TO WORK ON EXPLORING OPTIONS FOR TOILETING, BATHING.

## 2018-05-15 SDOH — ECONOMIC STABILITY: HOUSING INSECURITY: UNSAFE COOKING RANGE AREA: 0

## 2018-05-15 ASSESSMENT — ACTIVITIES OF DAILY LIVING (ADL)
DRESSING_LB_ASSISTANCE: 6
TELEPHONE_ASSISTANCE: 6
LAUNDRY_ASSISTANCE: 6
GROOMING_ASSISTANCE: 4
TOILETING_ASSISTANCE: 6
SHOPPING_ASSISTANCE: 6
BATHING_ASSISTANCE: 6
DRESSING_UB_ASSISTANCE: 6
HOUSEKEEPING_ASSISTANCE: 6
MEAL_PREP_ASSISTANCE: 6
ORAL_CARE_ASSISTANCE: 4
EATING_ASSISTANCE: 4
TRANSPORTATION_ASSISTANCE: 6

## 2018-05-15 ASSESSMENT — ENCOUNTER SYMPTOMS: MENTAL STATUS CHANGE: 0

## 2018-05-16 ENCOUNTER — HOME CARE VISIT (OUTPATIENT)
Dept: HOME HEALTH SERVICES | Facility: HOME HEALTHCARE | Age: 83
End: 2018-05-16
Payer: MEDICARE

## 2018-05-16 VITALS
OXYGEN SATURATION: 18 % | HEART RATE: 79 BPM | DIASTOLIC BLOOD PRESSURE: 68 MMHG | SYSTOLIC BLOOD PRESSURE: 108 MMHG | TEMPERATURE: 98 F

## 2018-05-16 PROCEDURE — G0180 MD CERTIFICATION HHA PATIENT: HCPCS | Performed by: INTERNAL MEDICINE

## 2018-05-17 ENCOUNTER — HOME CARE VISIT (OUTPATIENT)
Dept: HOME HEALTH SERVICES | Facility: HOME HEALTHCARE | Age: 83
End: 2018-05-17
Payer: MEDICARE

## 2018-05-17 VITALS
TEMPERATURE: 98.2 F | HEART RATE: 71 BPM | SYSTOLIC BLOOD PRESSURE: 120 MMHG | RESPIRATION RATE: 18 BRPM | OXYGEN SATURATION: 94 % | DIASTOLIC BLOOD PRESSURE: 80 MMHG

## 2018-05-17 PROCEDURE — G0153 HHCP-SVS OF S/L PATH,EA 15MN: HCPCS

## 2018-05-17 PROCEDURE — G0151 HHCP-SERV OF PT,EA 15 MIN: HCPCS

## 2018-05-17 PROCEDURE — G0299 HHS/HOSPICE OF RN EA 15 MIN: HCPCS

## 2018-05-17 ASSESSMENT — ACTIVITIES OF DAILY LIVING (ADL)
MEAL_PREP_ASSISTANCE: 6
LAUNDRY_ASSISTANCE: 6
ORAL_CARE_ASSISTANCE: 6
DRESSING_UB_ASSISTANCE: 6
TOILETING_ASSISTANCE: 6
EATING_ASSISTANCE: 6
GROOMING_ASSISTANCE: 6
SHOPPING_ASSISTANCE: 6
DRESSING_LB_ASSISTANCE: 6
TRANSPORTATION_ASSISTANCE: 6
TELEPHONE_ASSISTANCE: 6
BATHING_ASSISTANCE: 6
HOUSEKEEPING_ASSISTANCE: 6

## 2018-05-17 ASSESSMENT — ENCOUNTER SYMPTOMS
NAUSEA: DENIES
VOMITING: DENIES

## 2018-05-18 ENCOUNTER — OFFICE VISIT (OUTPATIENT)
Dept: MEDICAL GROUP | Facility: MEDICAL CENTER | Age: 83
End: 2018-05-18
Payer: MEDICARE

## 2018-05-18 ENCOUNTER — HOSPITAL ENCOUNTER (EMERGENCY)
Facility: MEDICAL CENTER | Age: 83
End: 2018-05-18
Attending: EMERGENCY MEDICINE
Payer: OTHER GOVERNMENT

## 2018-05-18 ENCOUNTER — APPOINTMENT (OUTPATIENT)
Dept: RADIOLOGY | Facility: MEDICAL CENTER | Age: 83
End: 2018-05-18
Attending: EMERGENCY MEDICINE
Payer: OTHER GOVERNMENT

## 2018-05-18 ENCOUNTER — HOME CARE VISIT (OUTPATIENT)
Dept: HOME HEALTH SERVICES | Facility: HOME HEALTHCARE | Age: 83
End: 2018-05-18
Payer: MEDICARE

## 2018-05-18 VITALS
OXYGEN SATURATION: 86 % | RESPIRATION RATE: 18 BRPM | SYSTOLIC BLOOD PRESSURE: 90 MMHG | HEART RATE: 73 BPM | HEIGHT: 63 IN | WEIGHT: 198 LBS | BODY MASS INDEX: 35.08 KG/M2 | DIASTOLIC BLOOD PRESSURE: 60 MMHG | TEMPERATURE: 97.8 F

## 2018-05-18 VITALS
RESPIRATION RATE: 16 BRPM | HEART RATE: 72 BPM | WEIGHT: 200 LBS | TEMPERATURE: 97.2 F | DIASTOLIC BLOOD PRESSURE: 64 MMHG | SYSTOLIC BLOOD PRESSURE: 112 MMHG | OXYGEN SATURATION: 95 % | BODY MASS INDEX: 35.44 KG/M2 | HEIGHT: 63 IN

## 2018-05-18 VITALS
DIASTOLIC BLOOD PRESSURE: 80 MMHG | RESPIRATION RATE: 18 BRPM | TEMPERATURE: 98.2 F | HEART RATE: 73 BPM | OXYGEN SATURATION: 94 % | SYSTOLIC BLOOD PRESSURE: 120 MMHG

## 2018-05-18 DIAGNOSIS — R09.02 HYPOXEMIA: ICD-10-CM

## 2018-05-18 DIAGNOSIS — Z66 DO NOT RESUSCITATE: ICD-10-CM

## 2018-05-18 DIAGNOSIS — Z09 HOSPITAL DISCHARGE FOLLOW-UP: ICD-10-CM

## 2018-05-18 DIAGNOSIS — I62.9 INTRACRANIAL HEMORRHAGE (HCC): ICD-10-CM

## 2018-05-18 DIAGNOSIS — R41.89 UNRESPONSIVE: ICD-10-CM

## 2018-05-18 DIAGNOSIS — E66.9 OBESITY (BMI 30.0-34.9): ICD-10-CM

## 2018-05-18 DIAGNOSIS — R53.2 FUNCTIONAL QUADRIPLEGIA (HCC): ICD-10-CM

## 2018-05-18 DIAGNOSIS — I89.0 LYMPHEDEMA: ICD-10-CM

## 2018-05-18 DIAGNOSIS — A41.9 SEPSIS, DUE TO UNSPECIFIED ORGANISM: ICD-10-CM

## 2018-05-18 PROBLEM — I61.9 RIGHT-SIDED NONTRAUMATIC INTRACEREBRAL HEMORRHAGE (HCC): Status: ACTIVE | Noted: 2018-05-18

## 2018-05-18 LAB
ALBUMIN SERPL BCP-MCNC: 4 G/DL (ref 3.2–4.9)
ALBUMIN/GLOB SERPL: 1.3 G/DL
ALP SERPL-CCNC: 59 U/L (ref 30–99)
ALT SERPL-CCNC: 7 U/L (ref 2–50)
ANION GAP SERPL CALC-SCNC: 11 MMOL/L (ref 0–11.9)
AST SERPL-CCNC: 20 U/L (ref 12–45)
BASOPHILS # BLD AUTO: 0.2 % (ref 0–1.8)
BASOPHILS # BLD: 0.05 K/UL (ref 0–0.12)
BILIRUB SERPL-MCNC: 0.9 MG/DL (ref 0.1–1.5)
BLOOD CULTURE HOLD CXBCH: NORMAL
BNP SERPL-MCNC: 20 PG/ML (ref 0–100)
BUN SERPL-MCNC: 19 MG/DL (ref 8–22)
CALCIUM SERPL-MCNC: 9.9 MG/DL (ref 8.5–10.5)
CHLORIDE SERPL-SCNC: 93 MMOL/L (ref 96–112)
CO2 SERPL-SCNC: 24 MMOL/L (ref 20–33)
CREAT SERPL-MCNC: 0.82 MG/DL (ref 0.5–1.4)
EKG IMPRESSION: NORMAL
EOSINOPHIL # BLD AUTO: 0.4 K/UL (ref 0–0.51)
EOSINOPHIL NFR BLD: 1.9 % (ref 0–6.9)
ERYTHROCYTE [DISTWIDTH] IN BLOOD BY AUTOMATED COUNT: 45.2 FL (ref 35.9–50)
GLOBULIN SER CALC-MCNC: 3.2 G/DL (ref 1.9–3.5)
GLUCOSE SERPL-MCNC: 114 MG/DL (ref 65–99)
HCT VFR BLD AUTO: 48.5 % (ref 37–47)
HGB BLD-MCNC: 16.7 G/DL (ref 12–16)
IMM GRANULOCYTES # BLD AUTO: 0.12 K/UL (ref 0–0.11)
IMM GRANULOCYTES NFR BLD AUTO: 0.6 % (ref 0–0.9)
LACTATE BLD-SCNC: 2.3 MMOL/L (ref 0.5–2)
LYMPHOCYTES # BLD AUTO: 3.48 K/UL (ref 1–4.8)
LYMPHOCYTES NFR BLD: 16.9 % (ref 22–41)
MCH RBC QN AUTO: 32.2 PG (ref 27–33)
MCHC RBC AUTO-ENTMCNC: 34.4 G/DL (ref 33.6–35)
MCV RBC AUTO: 93.4 FL (ref 81.4–97.8)
MONOCYTES # BLD AUTO: 0.88 K/UL (ref 0–0.85)
MONOCYTES NFR BLD AUTO: 4.3 % (ref 0–13.4)
NEUTROPHILS # BLD AUTO: 15.7 K/UL (ref 2–7.15)
NEUTROPHILS NFR BLD: 76.1 % (ref 44–72)
NRBC # BLD AUTO: 0 K/UL
NRBC BLD-RTO: 0 /100 WBC
PLATELET # BLD AUTO: 315 K/UL (ref 164–446)
PMV BLD AUTO: 9.6 FL (ref 9–12.9)
POTASSIUM SERPL-SCNC: 4.7 MMOL/L (ref 3.6–5.5)
PROT SERPL-MCNC: 7.2 G/DL (ref 6–8.2)
RBC # BLD AUTO: 5.19 M/UL (ref 4.2–5.4)
SODIUM SERPL-SCNC: 128 MMOL/L (ref 135–145)
TROPONIN I SERPL-MCNC: 0.01 NG/ML (ref 0–0.04)
WBC # BLD AUTO: 20.6 K/UL (ref 4.8–10.8)

## 2018-05-18 PROCEDURE — 99285 EMERGENCY DEPT VISIT HI MDM: CPT

## 2018-05-18 PROCEDURE — 83880 ASSAY OF NATRIURETIC PEPTIDE: CPT

## 2018-05-18 PROCEDURE — 70450 CT HEAD/BRAIN W/O DYE: CPT

## 2018-05-18 PROCEDURE — 83605 ASSAY OF LACTIC ACID: CPT

## 2018-05-18 PROCEDURE — 93005 ELECTROCARDIOGRAM TRACING: CPT | Performed by: EMERGENCY MEDICINE

## 2018-05-18 PROCEDURE — 99285 EMERGENCY DEPT VISIT HI MDM: CPT | Mod: GW | Performed by: INTERNAL MEDICINE

## 2018-05-18 PROCEDURE — 84484 ASSAY OF TROPONIN QUANT: CPT

## 2018-05-18 PROCEDURE — 74176 CT ABD & PELVIS W/O CONTRAST: CPT

## 2018-05-18 PROCEDURE — 99214 OFFICE O/P EST MOD 30 MIN: CPT | Performed by: INTERNAL MEDICINE

## 2018-05-18 PROCEDURE — 80053 COMPREHEN METABOLIC PANEL: CPT

## 2018-05-18 PROCEDURE — 85025 COMPLETE CBC W/AUTO DIFF WBC: CPT

## 2018-05-18 RX ORDER — POLYETHYLENE GLYCOL 3350 17 G/17G
1 POWDER, FOR SOLUTION ORAL
Status: DISCONTINUED | OUTPATIENT
Start: 2018-05-18 | End: 2018-05-18 | Stop reason: HOSPADM

## 2018-05-18 RX ORDER — CHOLECALCIFEROL (VITAMIN D3) 125 MCG
500 CAPSULE ORAL DAILY
COMMUNITY

## 2018-05-18 RX ORDER — BISACODYL 10 MG
10 SUPPOSITORY, RECTAL RECTAL
Status: DISCONTINUED | OUTPATIENT
Start: 2018-05-18 | End: 2018-05-18 | Stop reason: HOSPADM

## 2018-05-18 RX ORDER — AMOXICILLIN 250 MG
2 CAPSULE ORAL 2 TIMES DAILY
Status: DISCONTINUED | OUTPATIENT
Start: 2018-05-18 | End: 2018-05-18 | Stop reason: HOSPADM

## 2018-05-18 ASSESSMENT — PAIN SCALES - GENERAL
PAINLEVEL_OUTOF10: 0
PAINLEVEL_OUTOF10: ASSUMED PAIN PRESENT

## 2018-05-18 NOTE — DISCHARGE PLANNING
Spoke to Mean at [facility], referral has been accepted. One of their one call nurses will see pt will see pt within the next hour and a half to set up intake.  Anant notified.

## 2018-05-18 NOTE — ASSESSMENT & PLAN NOTE
She has room air hypoxemia with pulse ox 86%.  She is not taking deep breaths.  She is minimally responsive sitting in a wheelchair slumped over.

## 2018-05-18 NOTE — DISCHARGE PLANNING
Met with family, they would like to take patient home for impending death.  Wish to have hospice put into place, choice form presented and family chose [facility].  Choice form sent to [facility].

## 2018-05-18 NOTE — DISCHARGE PLANNING
Call and verified Hospice Referral has been received with [facility], admissions had stepped out but will review referral and call with status.

## 2018-05-18 NOTE — ASSESSMENT & PLAN NOTE
Since coming out of the hospital she has been only minimally responsive her caregivers report.  She spends most of the day sitting in a recliner or wheelchair slumped to one side.

## 2018-05-18 NOTE — PROGRESS NOTES
Subjective:     Chief Complaint   Patient presents with   • Hospital Follow-up     Very Tired/ falling alseep in wheelchair/ low oxygen x 10 days     Bing Bernal is a 88 y.o. female here today for follow-up from recent hospitalization for influenza A and presumed urinary infection.  She was in the hospital for 7 or 8 days apparently.  Since discharge she has been less responsive than previous and has poor oral intake of food and water.  She has 2 caregivers caring for her.    Hypoxemia  She has room air hypoxemia with pulse ox 86%.  She is not taking deep breaths.  She is minimally responsive sitting in a wheelchair slumped over.    Lymphedema  She has lymphedema of both lower extremities.  The lady is caring for her report that this is unchanged recently.    Obesity (BMI 30.0-34.9)  She remains significantly obese with BMI 35.07.  She is functionally a quadriplegic so not able to get any exercise.  She is very poorly responsive recently so has not been eating or drinking much.    Unresponsive  Since coming out of the hospital she has been only minimally responsive her caregivers report.  She spends most of the day sitting in a recliner or wheelchair slumped to one side.    Functional quadriplegia (CMS-HCC)  This lady is functionally a quadriplegic.  It takes two caregivers to get her up and move her around.  She spends most of the day in a recliner or wheelchair generally slumped over and poorly responsive which is fairly new finding since she left the hospital her caregivers report.       Diagnoses of Functional quadriplegia (HCC), Obesity (BMI 30.0-34.9), Lymphedema, Unresponsive, Hypoxemia, and Hospital discharge follow-up were pertinent to this visit.    Allergies: Alcohol; Aspirin; Tape; and Penicillins  Current medicines (including changes today)  Current Outpatient Prescriptions   Medication Sig Dispense Refill   • amLODIPine (NORVASC) 5 MG Tab Take 1 Tab by mouth every day. 30 Tab 1   • gabapentin  "(NEURONTIN) 400 MG Cap Take 1 Cap by mouth 3 times a day. 270 Cap 3   • lovastatin (MEVACOR) 20 MG Tab Take 1 Tab by mouth every day. 90 Tab 3   • carvedilol (COREG) 6.25 MG Tab Take 1 Tab by mouth 2 times a day, with meals. 180 Tab 3   • latanoprost (XALATAN) 0.005 % Solution Place 1 Drop in both eyes every evening.     • levothyroxine (SYNTHROID) 88 MCG Tab Take 1 Tab by mouth Every morning on an empty stomach. 90 Tab 3   • cyanocobalamin (VITAMIN B-12) 1000 MCG/ML Solution 1 mL by Intramuscular route every 30 days.  0   • vitamin D 2000 UNIT Tab Take 1 Tab by mouth every day. 60 Tab      Current Facility-Administered Medications   Medication Dose Route Frequency Provider Last Rate Last Dose   • cyanocobalamin (VITAMIN B-12) injection 1,000 mcg  1,000 mcg Intramuscular Q30 DAYS Miquel Donald M.D.   1,000 mcg at 11/06/17 1358       She  has a past medical history of Arthritis; Breast cancer (HCC); Cancer (HCC) (2000); Fall; High cholesterol; Hypertension; and Unspecified disorder of thyroid.    ROS    Patient has essentially no appetite.  Oral intake of food and water is very poor.  No significant lightheadedness or headaches.  No change in vision, hearing, or swallowing.  No new dyspnea, coughing, chest pain, or palpitations.  No indigestion, abdominal pain, or change in bowel habits.  She has not had a bowel movement in several days but oral intake has been quite limited.  No change in urinating.  No new ankle swelling.  She has bilateral lower extremity lymphedema.  This is unchanged recently her caregivers report.       Objective:     PE:  BP (!) 90/60   Pulse 73   Temp 36.6 °C (97.8 °F)   Resp 18   Ht 1.6 m (5' 3\")   Wt 89.8 kg (198 lb) Comment: In a Wheelchair  SpO2 (!) 86%   BMI 35.07 kg/m²    Neck is supple without significant lymphadenopathy or masses.  Lungs have rales at the right base posteriorly.  She is not taking deep breaths.  Cardiovascular: peripheral circulation is satisfactory, heart " sounds are unchanged and unremarkable.  Abdomen is soft, without masses or tenderness, with normal bowel sounds that are mildly decreased..  Extremities are without significant edema, cyanosis or deformity.  She has bilateral leg lymphedema that her caregivers report is unchanged recently.      Assessment and Plan:   The following treatment plan was discussed  1. Functional quadriplegia (HCC)      Caregiver givers are doing a fairly good job getting her up and moving her around.   2. Obesity (BMI 30.0-34.9)      No recommendation, oral intake currently is quite poor.   3. Lymphedema      Caregivers will report any exacerbation.   4. Unresponsive      Since this is a new finding since discharge from the hospital, family wants her to return to the hospital for further evaluation.   5. Hypoxemia      She needs to take deep breaths regularly and probably need oxygen supplementation.   6. Hospital discharge follow-up      Health condition has deteriorated since she left the hospital.  I recommended hospice but her  is not yet ready for that.       Followup: I have presented her  with options of repeat hospitalization or preferably hospice, but for now he refuses hospice.  I told him that long-term outlook for her is very poor and he needs to reconsider hospice.

## 2018-05-18 NOTE — ED TRIAGE NOTES
".  Chief Complaint   Patient presents with   • Sent by MD     ./59   Pulse 76   Temp 36.2 °C (97.1 °F)   Resp 18   Ht 1.6 m (5' 3\")   Wt 90.7 kg (200 lb)   SpO2 94%   BMI 35.43 kg/m²     BIB family from PCP appointment, patient recently discharged from here after being treated for sepsis, influenza A, UTI, patient lethargic, sleeping in wheelchair in triage, caregiver answering questions for patient, not eating/drinking at home, educated on triage process, placed in lobby with significant other and caregivers, told to inform staff of any changes in condition.    "

## 2018-05-18 NOTE — ASSESSMENT & PLAN NOTE
This lady is functionally a quadriplegic.  It takes two caregivers to get her up and move her around.  She spends most of the day in a recliner or wheelchair generally slumped over and poorly responsive which is fairly new finding since she left the hospital her caregivers report.

## 2018-05-18 NOTE — ED PROVIDER NOTES
ED Provider Note    CHIEF COMPLAINT  Chief Complaint   Patient presents with   • Sent by MD CRAWLEY  Bing Bernal is a 88 y.o. female who presents for evaluation of altered mental status. The patient is a complex medical history is as below. She was recently admitted at this facility for urinary tract infection as well as influenza and pneumonia. She was doing much better but went home for the last 7 day days and progressively got more lethargic. She is cared for by her 90-year-old  as well as in-house home care. They report that she is becoming increasingly confused. There is no report of seizure or fall. Patient cannot provide any meaningful history history is obtained chart review and from talking to the family and the caregiver. He is a decreased oral intake and no bowel movement no urinary output    REVIEW OF SYSTEMS  See HPI for further details. Positive for altered mental status All other systems are negative.     PAST MEDICAL HISTORY  Past Medical History:   Diagnosis Date   • Cancer (HCC) 2000    breast right   • Arthritis    • Breast cancer (HCC)    • Fall    • High cholesterol    • Hypertension    • Unspecified disorder of thyroid        FAMILY HISTORY  Noncontributory    SOCIAL HISTORY  Social History     Social History   • Marital status:      Spouse name: N/A   • Number of children: N/A   • Years of education: N/A     Social History Main Topics   • Smoking status: Never Smoker   • Smokeless tobacco: Never Used   • Alcohol use No   • Drug use: No   • Sexual activity: Not on file     Other Topics Concern   • Not on file     Social History Narrative   • No narrative on file   Lives with biological     SURGICAL HISTORY  Past Surgical History:   Procedure Laterality Date   • ANKLE ORIF Right 3/31/2016    Procedure: ANKLE ORIF;  Surgeon: Lalo Jarquin M.D.;  Location: SURGERY [facility];  Service:    • IRRIGATION & DEBRIDEMENT ORTHO Right 3/31/2016    Procedure:  IRRIGATION & DEBRIDEMENT ORTHO;  Surgeon: Lalo Jarquin M.D.;  Location: SURGERY [facility];  Service:    • KNEE REVISION TOTAL Right 2/26/2016    Procedure: KNEE REVISION TOTAL;  Surgeon: Mk Arias M.D.;  Location: SURGERY [facility];  Service:    • BONE ASPIRATION BIOPSY Right 2/23/2016    Procedure: BONE ASPIRATION BIOPSY- Knee ;  Surgeon: Mk Arias M.D.;  Location: SURGERY [facility];  Service:    • INJ,EPIDURAL/LUMB/SAC SINGLE  5/21/2013    Performed by Madison De La Garza M.D. at SURGERY [facility] [place] [place]   • INJ,EPIDURAL/LUMB/SAC SINGLE  8/28/2012    Performed by MADISON DE LA GARZA at [facility]   • COLONOSCOPY  5/24/2012    Performed by SARAVANAN MOONEY at [facility]ERY [facility]   • COLONOSCOPY  11/23/2011    Performed by SARAVANAN MOONEY at SURGERY [facility]   • COLONOSCOPY  7/6/2011    Performed by SARAVANAN MOONEY at [facility]ERY [facility]   • COLONOSCOPY  9/1/2010    Performed by SARAVANAN MOONEY at [facility]ERY [facility]   • CARPAL TUNNEL RELEASE  7/30/2010    Performed by GENNA FAJARDO at [facility]ERY [facility]   • GUYONS TUNNEL RELEASE  7/30/2010    Performed by GENNA FAJARDO at [facility]ERY [facility]   • ABDOMINAL HYSTERECTOMY TOTAL     • CATARACT EXTRACTION WITH IOL      bilat   • HAMMERTOE CORRECTION  right   • MAMMOPLASTY REDUCTION     • MASTECTOMY  right   • OTHER ORTHOPEDIC SURGERY      r ankle   • OTHER ORTHOPEDIC SURGERY      rt knee x3   • OTHER ORTHOPEDIC SURGERY      lt knee x5   • PB APPENDECTOMY     • PB RADIATION THERAPY PLAN SIMPLE     • PB TOTAL KNEE ARTHROPLASTY      rakesh each twice   • SD CHEMOTHERAPY, UNSPECIFIED PROCEDURE     • TONSILLECTOMY AND ADENOIDECTOMY         CURRENT MEDICATIONS    Current Facility-Administered Medications:   •  cyanocobalamin (VITAMIN B-12) injection 1,000 mcg, 1,000 mcg, Intramuscular, Q30 DAYS, Miquel Donald M.D., 1,000 mcg at 11/06/17 1358    Current Outpatient  "Prescriptions:   •  amLODIPine (NORVASC) 5 MG Tab, Take 1 Tab by mouth every day., Disp: 30 Tab, Rfl: 1  •  gabapentin (NEURONTIN) 400 MG Cap, Take 1 Cap by mouth 3 times a day., Disp: 270 Cap, Rfl: 3  •  lovastatin (MEVACOR) 20 MG Tab, Take 1 Tab by mouth every day., Disp: 90 Tab, Rfl: 3  •  carvedilol (COREG) 6.25 MG Tab, Take 1 Tab by mouth 2 times a day, with meals., Disp: 180 Tab, Rfl: 3  •  latanoprost (XALATAN) 0.005 % Solution, Place 1 Drop in both eyes every evening., Disp: , Rfl:   •  levothyroxine (SYNTHROID) 88 MCG Tab, Take 1 Tab by mouth Every morning on an empty stomach., Disp: 90 Tab, Rfl: 3  •  cyanocobalamin (VITAMIN B-12) 1000 MCG/ML Solution, 1 mL by Intramuscular route every 30 days., Disp: , Rfl: 0  •  vitamin D 2000 UNIT Tab, Take 1 Tab by mouth every day., Disp: 60 Tab, Rfl:     ALLERGIES  Allergies   Allergen Reactions   • Alcohol Shortness of Breath     Rubbing alcohol cause her to lose her voice   • Aspirin Rash   • Tape Contact Dermatitis     Tears skin   • Penicillins      Tolerates ceftriaxone 5/1/18       PHYSICAL EXAM  VITAL SIGNS: /59   Pulse 76   Temp 36.2 °C (97.1 °F)   Resp 18   Ht 1.6 m (5' 3\")   Wt 90.7 kg (200 lb)   SpO2 94%   BMI 35.43 kg/m²        Constitutional: Morbidly obese obtunded and lethargic  HENT: Normocephalic, Atraumatic, Bilateral external ears normal, mucous membranes, No oral exudates, Nose normal.   Eyes: PERRLA, EOMI, Conjunctiva normal, No discharge.   Neck: Normal range of motion, No tenderness, Supple, No stridor.   Cardiovascular: Normal heart rate, Normal rhythm, No murmurs, No rubs, No gallops.   Thorax & Lungs: Minutes breath sounds bilaterally  Abdomen: Bowel sounds normal, Soft, No tenderness, No masses, No pulsatile masses.   Skin: Warm, Dry, No erythema, No rash.   Back: No tenderness, No CVA tenderness.   Extremities: Intact distal pulses, No edema, No tenderness, No cyanosis, No clubbing.   Musculoskeletal: Good range of motion in " all major joints. No tenderness to palpation or major deformities noted.   Neurologic: Sluggish response to painful stimuli does not answer questions will follow basic one step commands no active seizures    EKG  Interpretation by me sinus rhythm right bundle branch block and left posterior fascicular block as he segment elevation or depression or pathological T-wave inversion or ectopy    RADIOLOGY/PROCEDURES  CT-HEAD W/O   Final Result      1.  Cerebral atrophy.      2.  White matter lucencies most consistent with small vessel ischemic change versus demyelination or gliosis.      3. 2.6 x 3.9 cm right temporal lobe intra-axial hemorrhage. Mild associated right-sided mass effect.   Findings were discussed with CHEO MELISSA on 5/18/2018 1:46 PM.      CT-ABDOMEN-PELVIS W/O   Final Result         1. Air-fluid levels throughout the colon, in keeping with diarrheal disease.      2. Cholelithiasis.      3. Small focus of air in the urinary bladder. Correlate with recent instrumentation. The differential includes gas-forming infection.      4. Patchy bibasilar opacities, atelectasis versus consolidation.      DX-CHEST-PORTABLE (1 VIEW)    (Results Pending)       Results for orders placed or performed during the hospital encounter of 05/18/18   LACTIC ACID   Result Value Ref Range    Lactic Acid 2.3 (H) 0.5 - 2.0 mmol/L   CBC WITH DIFFERENTIAL   Result Value Ref Range    WBC 20.6 (H) 4.8 - 10.8 K/uL    RBC 5.19 4.20 - 5.40 M/uL    Hemoglobin 16.7 (H) 12.0 - 16.0 g/dL    Hematocrit 48.5 (H) 37.0 - 47.0 %    MCV 93.4 81.4 - 97.8 fL    MCH 32.2 27.0 - 33.0 pg    MCHC 34.4 33.6 - 35.0 g/dL    RDW 45.2 35.9 - 50.0 fL    Platelet Count 315 164 - 446 K/uL    MPV 9.6 9.0 - 12.9 fL    Neutrophils-Polys 76.10 (H) 44.00 - 72.00 %    Lymphocytes 16.90 (L) 22.00 - 41.00 %    Monocytes 4.30 0.00 - 13.40 %    Eosinophils 1.90 0.00 - 6.90 %    Basophils 0.20 0.00 - 1.80 %    Immature Granulocytes 0.60 0.00 - 0.90 %    Nucleated RBC 0.00  /100 WBC    Neutrophils (Absolute) 15.70 (H) 2.00 - 7.15 K/uL    Lymphs (Absolute) 3.48 1.00 - 4.80 K/uL    Monos (Absolute) 0.88 (H) 0.00 - 0.85 K/uL    Eos (Absolute) 0.40 0.00 - 0.51 K/uL    Baso (Absolute) 0.05 0.00 - 0.12 K/uL    Immature Granulocytes (abs) 0.12 (H) 0.00 - 0.11 K/uL    NRBC (Absolute) 0.00 K/uL   COMP METABOLIC PANEL   Result Value Ref Range    Sodium 128 (L) 135 - 145 mmol/L    Potassium 4.7 3.6 - 5.5 mmol/L    Chloride 93 (L) 96 - 112 mmol/L    Co2 24 20 - 33 mmol/L    Anion Gap 11.0 0.0 - 11.9    Glucose 114 (H) 65 - 99 mg/dL    Bun 19 8 - 22 mg/dL    Creatinine 0.82 0.50 - 1.40 mg/dL    Calcium 9.9 8.5 - 10.5 mg/dL    AST(SGOT) 20 12 - 45 U/L    ALT(SGPT) 7 2 - 50 U/L    Alkaline Phosphatase 59 30 - 99 U/L    Total Bilirubin 0.9 0.1 - 1.5 mg/dL    Albumin 4.0 3.2 - 4.9 g/dL    Total Protein 7.2 6.0 - 8.2 g/dL    Globulin 3.2 1.9 - 3.5 g/dL    A-G Ratio 1.3 g/dL   TROPONIN   Result Value Ref Range    Troponin I 0.01 0.00 - 0.04 ng/mL   ESTIMATED GFR   Result Value Ref Range    GFR If African American >60 >60 mL/min/1.73 m 2    GFR If Non African American >60 >60 mL/min/1.73 m 2   EKG (ER)   Result Value Ref Range    Report       [facility]    Test Date:  2018  Pt Name:    GIOVANA GUZMÁN              Department: ER  MRN:        [id number]                      Room:       [facility]  Gender:     Female                       Technician: [id number]  :        [id number]                   Requested By:CHEO MELISSA  Order #:    [id number]                    Reading MD:    Measurements  Intervals                                Axis  Rate:       86                           P:          47  WI:         188                          QRS:        -83  QRSD:       148                          T:          26  QT:         404  QTc:        484    Interpretive Statements  SINUS RHYTHM  RBBB AND LAFB  Compared to ECG 2018 19:15:58  No significant changes        COURSE &  MEDICAL DECISION MAKING  Pertinent Labs & Imaging studies reviewed. (See chart for details)  Patient was critically ill on arrival. She was lethargic. I've ordered a septic protocol and blood cultures and lactic acid. Here she has evidence of dehydration as well as profound altered mental status. CT scan of the head demonstrates a spontaneous into proximal hemorrhage with mass effect. She also has leukocytosis likely has underlying pneumonia and/or urinary tract infection. I long talk with the family including the caregiver and . She had previously been do not resuscitate but given the grave nature of her presenting illness today they have decided to make her comfort care. He would like pain medicine and nausea medicine and no antibiotics fluids no chest compressions intubation and no surgical procedures performed    CRITICAL CARE TIME:    The patient required approximately 38 minutes worth of critical care time. This excludes any procedures. This includes time spent directly at caring for the patient, making critical medical decisions, involving consultants and speaking with the family.    FINAL IMPRESSION  1. Sepsis  2. Spontaneous intracranial hemorrhage with coma  3. Comfort care      Addendum: The family had a long discussion with hospice and comfort care. They would prefer to take her home. They understand that her prognosis is quite grave and they specifically did not want any intervention such as fluids, antibiotics additional imaging studies    Electronically signed by: Demarcus Staton, 5/18/2018 12:38 PM

## 2018-05-18 NOTE — ASSESSMENT & PLAN NOTE
She remains significantly obese with BMI 35.07.  She is functionally a quadriplegic so not able to get any exercise.  She is very poorly responsive recently so has not been eating or drinking much.

## 2018-05-18 NOTE — ASSESSMENT & PLAN NOTE
She has lymphedema of both lower extremities.  The lady is caring for her report that this is unchanged recently.

## 2018-05-18 NOTE — DISCHARGE PLANNING
RN, MD notes sent to [facility] as requested. They will try to get a nurse out here. They are aware pt is currently in the ER and will discharge home today.

## 2018-05-18 NOTE — CONSULTS
Hospital Medicine Consultation     Date of Service  5/18/2018    Chief Complaint  Chief Complaint   Patient presents with   • Sent by MD       History of Presenting Illness  88 y.o. female who presented 5/18/2018 with altered mental status. Pt was recently here, began declining at home. Pt became more and more lethargic with confusion. Pt with decreased activity and oral intake. Upon arrival pt noted to have right temporal lobe intra-axial hemorrhage. After discussion with Dr Chiu family decided to go comfort care.  I discussed options with family, they very much wanted to take pt home. This was discussed with case management, hospice order placed.     Primary Care Physician  Miquel Donald M.D.    Requesting Physician  ERP - Dr Staton    Code Status  Comfort care    Review of Systems  Review of Systems   Unable to perform ROS: Acuity of condition        Past Medical History  Past Medical History:   Diagnosis Date   • Cancer (HCC) 2000    breast right   • Arthritis    • Breast cancer (HCC)    • Fall    • High cholesterol    • Hypertension    • Unspecified disorder of thyroid        Surgical History  Past Surgical History:   Procedure Laterality Date   • ANKLE ORIF Right 3/31/2016    Procedure: ANKLE ORIF;  Surgeon: Lalo Jarquin M.D.;  Location: [facility];  Service:    • IRRIGATION & DEBRIDEMENT ORTHO Right 3/31/2016    Procedure: IRRIGATION & DEBRIDEMENT ORTHO;  Surgeon: Lalo Jarquin M.D.;  Location: [facility];  Service:    • KNEE REVISION TOTAL Right 2/26/2016    Procedure: KNEE REVISION TOTAL;  Surgeon: Mk Arias M.D.;  Location: [facility];  Service:    • BONE ASPIRATION BIOPSY Right 2/23/2016    Procedure: BONE ASPIRATION BIOPSY- Knee ;  Surgeon: Mk Arias M.D.;  Location: [facility];  Service:    • INJ,EPIDURAL/LUMB/SAC SINGLE  5/21/2013    Performed by Manoj Franco M.D. at [facility]   •  INJ,EPIDURAL/LUMB/SAC SINGLE  8/28/2012    Performed by MADISON DE LA GARZA at [facility]ERY [facility]   • COLONOSCOPY  5/24/2012    Performed by SARAVANAN MOONEY at SURGERY [facility]   • COLONOSCOPY  11/23/2011    Performed by SARAVANAN MOONEY at SURGERY [facility]   • COLONOSCOPY  7/6/2011    Performed by SARAVANAN MOONEY at [facility]ERY [facility]   • COLONOSCOPY  9/1/2010    Performed by SARAVANAN MOONEY at [facility]ERY [facility]   • CARPAL TUNNEL RELEASE  7/30/2010    Performed by GENNA FAJARDO at SUR[facility]ERY [facility]   • GUYONS TUNNEL RELEASE  7/30/2010    Performed by GENNA FAJARDO at SURGERY [facility]   • ABDOMINAL HYSTERECTOMY TOTAL     • CATARACT EXTRACTION WITH IOL      bilat   • HAMMERTOE CORRECTION  right   • MAMMOPLASTY REDUCTION     • MASTECTOMY  right   • OTHER ORTHOPEDIC SURGERY      r ankle   • OTHER ORTHOPEDIC SURGERY      rt knee x3   • OTHER ORTHOPEDIC SURGERY      lt knee x5   • PB APPENDECTOMY     • PB RADIATION THERAPY PLAN SIMPLE     • PB TOTAL KNEE ARTHROPLASTY      rakesh each twice   • CT CHEMOTHERAPY, UNSPECIFIED PROCEDURE     • TONSILLECTOMY AND ADENOIDECTOMY         Medications  Current Facility-Administered Medications on File Prior to Encounter   Medication Dose Route Frequency Provider Last Rate Last Dose   • cyanocobalamin (VITAMIN B-12) injection 1,000 mcg  1,000 mcg Intramuscular Q30 DAYS Miquel Donald M.D.   1,000 mcg at 11/06/17 1358     Current Outpatient Prescriptions on File Prior to Encounter   Medication Sig Dispense Refill   • amLODIPine (NORVASC) 5 MG Tab Take 1 Tab by mouth every day. 30 Tab 1   • gabapentin (NEURONTIN) 400 MG Cap Take 1 Cap by mouth 3 times a day. 270 Cap 3   • lovastatin (MEVACOR) 20 MG Tab Take 1 Tab by mouth every day. 90 Tab 3   • carvedilol (COREG) 6.25 MG Tab Take 1 Tab by mouth 2 times a day, with meals. 180 Tab 3   • latanoprost (XALATAN) 0.005 % Solution Place 1 Drop in both eyes every evening.      • levothyroxine (SYNTHROID) 88 MCG Tab Take 1 Tab by mouth Every morning on an empty stomach. 90 Tab 3   • vitamin D 2000 UNIT Tab Take 1 Tab by mouth every day. 60 Tab        Family History  Family History   Problem Relation Age of Onset   • Diabetes Daughter    • Cancer Sister        Social History  Social History   Substance Use Topics   • Smoking status: Never Smoker   • Smokeless tobacco: Never Used   • Alcohol use No       Allergies  Allergies   Allergen Reactions   • Alcohol Shortness of Breath     Rubbing alcohol cause her to lose her voice   • Aspirin Rash   • Tape Contact Dermatitis     Tears skin   • Penicillins      Tolerates ceftriaxone 18        Physical Exam  Laboratory   Hemodynamics  Temp (24hrs), Av.2 °C (97.1 °F), Min:36.2 °C (97.1 °F), Max:36.2 °C (97.1 °F)   Temperature: 36.2 °C (97.1 °F)  Pulse  Av.5  Min: 76  Max: 93    Blood Pressure : 117/46      Respiratory      Respiration: 14, Pulse Oximetry: 93 %        RUL Breath Sounds: Diminished, HUGH Breath Sounds: Diminished    Physical Exam   Constitutional: She appears ill. No distress.   HENT:   Head: Normocephalic and atraumatic.   Mouth/Throat: No oropharyngeal exudate.   Eyes: Right eye exhibits no discharge. Left eye exhibits no discharge.   Neck: Neck supple. No tracheal deviation present.   Cardiovascular: Normal rate and regular rhythm.  Exam reveals no gallop and no friction rub.    Murmur heard.  Pulmonary/Chest: Effort normal. No stridor. No respiratory distress. She has decreased breath sounds. She has no wheezes. She has no rales.   Abdominal: Soft. Bowel sounds are normal. She exhibits no distension.   Musculoskeletal: She exhibits edema.   Lymphadenopathy:     She has no cervical adenopathy.   Neurological:   Somnolent, mostly nonverbal    Skin: Skin is warm and dry. No rash noted. She is not diaphoretic. No erythema.   Psychiatric: She is slowed. Cognition and memory are impaired.   Nursing note and vitals  reviewed.      Recent Labs      05/18/18   1254   WBC  20.6*   RBC  5.19   HEMOGLOBIN  16.7*   HEMATOCRIT  48.5*   MCV  93.4   MCH  32.2   MCHC  34.4   RDW  45.2   PLATELETCT  315   MPV  9.6     Recent Labs      05/18/18   1254   SODIUM  128*   POTASSIUM  4.7   CHLORIDE  93*   CO2  24   GLUCOSE  114*   BUN  19   CREATININE  0.82   CALCIUM  9.9     Recent Labs      05/18/18   1254   ALTSGPT  7   ASTSGOT  20   ALKPHOSPHAT  59   TBILIRUBIN  0.9   GLUCOSE  114*         Recent Labs      05/18/18   1254   BNPBTYPENAT  20         Lab Results   Component Value Date    TROPONINI 0.01 05/18/2018     Urinalysis:    Lab Results  Component Value Date/Time   SPECGRAVITY 1.025 05/01/2018 1455   GLUCOSEUR Negative 05/01/2018 1455   KETONES 15 (A) 05/01/2018 1455   NITRITE Positive (A) 05/01/2018 1455   WBCURINE Packed (A) 05/01/2018 1455   RBCURINE 10-20 (A) 05/01/2018 1455   BACTERIA Moderate (A) 05/01/2018 1455   EPITHELCELL Few 05/01/2018 1455        Imaging  CT abd/pelvis - 1. Air-fluid levels throughout the colon, in keeping with diarrheal disease.  2. Cholelithiasis.  3. Small focus of air in the urinary bladder. Correlate with recent instrumentation. The differential includes gas-forming infection.  4. Patchy bibasilar opacities, atelectasis versus consolidation.    CT head - 1.  Cerebral atrophy.  2.  White matter lucencies most consistent with small vessel ischemic change versus demyelination or gliosis.  3. 2.6 x 3.9 cm right temporal lobe intra-axial hemorrhage. Mild associated right-sided mass effect.   Assessment/Plan       Right-sided nontraumatic intracerebral hemorrhage (HCC)   Assessment & Plan    - family understand, made comfort care decision  - they wanted to get pt home, I discussed with them  - case management discussed with them as well, hospice order placed  - hospice has now discussed plan with them, likely home with hospice         HTN (hypertension)- (present on admission)   Assessment & Plan    - was on  coreg and norvasc  - now comfort care, likely home with hospice        Leukocytosis- (present on admission)   Assessment & Plan    - reactive vs infectious  - concern for sepsis  - now comfort care        Acute metabolic encephalopathy- (present on admission)   Assessment & Plan    - d/t ICH  - pt now comfort care, likely home with hospice        Hyponatremia- (present on admission)   Assessment & Plan    - mild, no additional w/u or treatment        Acquired hypothyroidism- (present on admission)   Assessment & Plan    - was synthroid

## 2018-05-19 ENCOUNTER — HOME CARE VISIT (OUTPATIENT)
Dept: HOME HEALTH SERVICES | Facility: HOME HEALTHCARE | Age: 83
End: 2018-05-19
Payer: MEDICARE

## 2018-05-19 NOTE — ED NOTES
Patient discharged home with [facility] and family. Verbalizes understanding of discharge instructions.

## 2018-05-19 NOTE — DISCHARGE INSTRUCTIONS
Intracranial Hemorrhage  An intracranial hemorrhage is bleeding in the layers between the skull (cranium) and brain. A blood vessel bursts and allows blood to leak inside the cranial cavity. The leaking blood then collects (hematoma). This causes pressure and damage to brain cells. The bleeding can be mild to severe. In severe cases, it can lead to permanent damage or death. Symptoms may come on suddenly or develop over time. Early diagnosis and treatment leads to better recovery.  There are four types of intracranial hemorrhage: subarachnoid, subdural, extradural, or cerebral hemorrhage.  What are the causes?  · Head injury (trauma).  · Ruptured brain aneurysm.  · Bleeding from blood vessels that develop abnormally (arteriovenous malformation).  · Bleeding disorder.  · Use of blood thinners (anticoagulants).  · Use of certain drugs, such as cocaine.  For some people with intracranial hemorrhage, the cause is unknown.  What increases the risk?  · Using tobacco products, such as cigarettes and chewing tobacco.  · Having high blood pressure (hypertension).  · Abusing alcohol.  · Being a female, especially of postmenopausal age.  · Having a family history of disease in the blood vessels of the brain (cerebrovascular disease).  · Having certain genetic syndromes that result in kidney disease or connective tissue disease.  What are the signs or symptoms?  · A sudden, severe headache with no known cause. The headache is often described as the worst headache ever experienced.  · Nausea or vomiting, especially when combined with other symptoms such as a headache.  · Sudden weakness or numbness of the face, arm, or leg, especially on one side of the body.  · Sudden trouble walking or difficulty moving arms or legs.  · Sudden confusion.  · Sudden personality changes.  · Trouble speaking (aphasia) or understanding.  · Difficulty swallowing.  · Sudden trouble seeing in one or both eyes.  · Double vision.  · Dizziness.  · Loss  of balance or coordination.  · Intolerance to light.  · Stiff neck.  How is this diagnosed?  Your health care provider will perform a physical exam and ask about your symptoms. If an intracranial hemorrhage is suspected, various tests may be ordered. These tests may include:  · A CT scan.  · An MRI.  · A cerebral angiogram.  · A spinal tap (lumbar puncture).  · Blood tests.  How is this treated?  Immediate treatment in the hospital is often required to reduce the risk of brain damage. Treatment will depend on the cause of the bleeding, where it is located, and the extent of the bleeding and damage. The goals of treatment include stopping the bleeding, repairing the cause of bleeding, providing relief of symptoms, and preventing problems.  · Medicines may be given to:  ¨ Lower blood pressure (antihypertensives).  ¨ Relieve pain (analgesics).  ¨ Relieve nausea or vomiting.  · Surgery may be needed to stop the bleeding, repair the cause of the bleeding, or remove the blood.  · Rehabilitation may be needed to improve any cognitive and day-to-day functions impaired by the condition.  Further treatment depends on the duration, severity, and cause of your symptoms. Physical, speech, and occupational therapists will assess you and work to improve any functions impaired by the intracranial hemorrhage. Measures will be taken to prevent short-term and long-term problems, including infection from breathing foreign material into the lungs (aspiration pneumonia), blood clots in the legs, bedsores, and falls.  Follow these instructions at home:  · Take medicines only as directed by your health care provider.  · Eat healthy foods as directed by your health care provider:  ¨ A diet low in salt (sodium), saturated fat, trans fat, and cholesterol may be recommended to manage your blood pressure.  ¨ Foods may need to be soft or pureed, or small bites may need to be taken in order to avoid aspirating or choking.  ¨ If studies show that  your ability to swallow safely has been affected, you may need to seek help from specialists such as a dietitian, speech and language pathologist, or an occupational therapist. These health care providers can teach you how to safely get the nutrition your body needs.  · Rest and limit activities or movements as directed by your health care provider.  · Do not use any tobacco products including cigarettes, chewing tobacco, or electronic cigarettes. If you need help quitting, ask your health care provider.  · Limit alcohol intake to no more than 1 drink per day for nonpregnant women and 2 drinks per day for men. One drink equals 12 ounces of beer, 5 ounces of wine, or 1½ ounces of hard liquor.  · Make any other lifestyle changes as directed by your health care provider.  · Monitor and record your blood pressure as directed by your health care provider.  · A safe home environment is important to reduce the risk of falls. Your health care provider may arrange for specialists to evaluate your home. Having grab bars in the bedroom and bathroom is often important. Your health care provider may arrange for special equipment to be used at home, such as raised toilets and a seat for the shower.  · Do physical, occupational, and speech therapy as directed by your health care provider. Ongoing therapy may be needed to maximize your recovery.  · Use a walker or a cane at all times if directed by your health care provider.  · Keep all follow-up visits with your health care provider and other specialists. This is important. This includes any referrals, physical therapy, and rehabilitation.  Get help right away if:  · You have a sudden, severe headache with no known cause.  · You have nausea or vomiting occurring with another symptom.  · You have sudden weakness or numbness of the face, arm, or leg, especially on one side of the body.  · You have sudden trouble walking or difficulty moving your arms or legs.  · You have sudden  confusion.  · You have trouble speaking (aphasia) or understanding.  · You have sudden trouble seeing in one or both eyes.  · You have a sudden loss of balance or coordination.  · You have a stiff neck.  · You have difficulty breathing.  · You have a partial or total loss of consciousness.  These symptoms may represent a serious problem that is an emergency. Do not wait to see if the symptoms will go away. Get medical help right away. Call your local emergency services (911 in the [place] Do not drive yourself to the hospital.   This information is not intended to replace advice given to you by your health care provider. Make sure you discuss any questions you have with your health care provider.  Document Released: 07/15/2015 Document Revised: 05/19/2017 Document Reviewed: 02/11/2015  [employer] Interactive Patient Education © 2017 [employer]    Death and Dying  When a person's health care team determines that a terminal illness can no longer be controlled, medical testing and treatment often stop. But the person's care continues. The care focuses on making the person comfortable. The person receives medicines and treatments to control pain and other symptoms, such as constipation, nausea, and shortness of breath. Some people remain at home during this time, while others enter a hospital or other facility. Either way, services are available to help individuals and their families with the medical, psychological, and spiritual issues surrounding dying. A hospice team often provides such services.   The time at the end of life is different for each person. Individuals and their families have unique needs for information and support. Questions and concerns about the end of life should be discussed with the health care team as they arise.  HOW LONG IS THE PERSON EXPECTED TO LIVE?  Family members often want to know how long their loved one is expected to live. This is a hard question to answer. Factors such as where the  disease is located and whether the person has other illnesses can affect what will happen. Although health care providers may be able to make an estimate based on what they know about the person, they might be hesitant to do so. Health care providers may be concerned about overestimating or underestimating the person's life span. They also might be fearful of instilling false hope or destroying the person's hope.  WHEN CARING FOR THE PERSON AT HOME, WHEN SHOULD YOU CALL A PROFESSIONAL?  When caring for your loved one at home, there may be times when you need assistance from your loved one's health care team. You can contact the health care team for help in any of the following situations:  · Your loved one is in pain that is not relieved by the prescribed dose of pain medicine.  · Your loved one shows discomfort, such as grimacing or moaning.  · Your loved one is having trouble breathing and seems upset.  · Your loved one is unable to urinate or empty the bowels.  · Your loved one has fallen.  · Your loved one is very depressed or talking about committing suicide.  · You have difficulty giving medicine to your loved one.  · You are overwhelmed by caring for your loved one or are too grieved or afraid to be with your loved one.  · At any time you do not know how to handle a situation.  WHAT ARE SOME WAYS THAT YOU CAN PROVIDE EMOTIONAL COMFORT TO YOUR SICK LOVED ONE?  Everyone has different needs, but some emotions are common to most individuals who are dying. These include fear of abandonment and fear of being a burden. They also have concerns about loss of dignity and loss of control. Some ways you can provide comfort are as follows:  · Keep your loved one company. Talk, watch movies, read, or just be with him or her.  · Allow your loved one to express fears and concerns about dying, such as leaving family and friends behind. Be prepared to listen.  · Be willing to reminisce about your loved one's life.  · Avoid  withholding difficult information. Most people prefer to be included in discussions about issues that concern them.  · Reassure your loved one that you will honor advance directives, such as living [place].  · Ask if there is anything you can do.  · Respect your loved one's need for privacy.  WHAT ARE THE SIGNS THAT DEATH IS APPROACHING?  Certain signs and symptoms can help you anticipate when death is near. They are described below, along with suggestions for managing them. It is important to remember that not every person experiences each of the signs and symptoms. Having one or more of these symptoms does not always indicate that your loved one is close to death. A member of your loved one's health care team can give you information about what to expect.  · Drowsiness, increased sleep, or unresponsiveness. This is caused by changes in the person's metabolism.  · Confusion about time, place, or identity of family and friends; restlessness; visions of people and places that are not present; pulling at bed linens or clothing (caused in part by changes in your loved one's metabolism).  · Withdrawal and decreased socialization. This may be caused by decreased oxygen to the brain, decreased blood flow, and mental preparation for dying.  · Decreased need for food and fluids, and loss of appetite. This is caused by the body's need to conserve energy and its decreasing ability to use food and fluids properly.  · Loss of bladder or bowel control. This is caused by the relaxing of muscles in the pelvic area. You can talk to your loved one's health care team about the possibility of inserting a catheter. A member of the health care team can teach you how to take care of the catheter, if one is needed.  · Skin becomes cool to the touch, particularly the hands and feet. Skin may become bluish in color, especially on the underside of the body. This is caused by decreased circulation to the extremities.  · Rattling or gurgling  sounds while breathing that may be loud; breathing that is irregular and shallow; decreased number of breaths per minute; breathing that alternates between rapid and slow. This is caused by congestion from fluid, a buildup of waste products in the body, and a decrease in circulation to the organs.  · Turning the head toward a light source. This is caused by decreasing vision. Leave soft, indirect lights on in the room.  · Increased difficulty controlling pain. This is caused by progression of the disease. It is important to provide pain medicines as your loved one's health care provider has prescribed.  · Involuntary movements, changes in heart rate, and loss of reflexes in the legs and arms are also signs that the end of life is near.  WHAT CAN YOU DO TO MAKE THE PERSON COMFORTABLE?  · Plan visits and activities for times when your loved one is alert. It is important to speak directly to your loved one and talk as if he or she can hear, even if there is no response. Most people are still able to hear after they are no longer able to speak. Your loved one should not be shaken if he or she does not respond.  · Gently remind your loved one of the time, date, and people who are present. If your loved one is agitated, do not attempt to restrain him or her. Be calm and reassuring. Speaking calmly may help to re-orient your loved one.  · Allow your loved one to choose if and when to eat or drink. Ice chips, water, or juice may be refreshing if the person can swallow. Keep the person's mouth and lips moist with products such as glycerin swabs and lip balm.  · Keep your loved one as clean, dry, and comfortable as possible. Place disposable pads on the bed beneath the person and remove them when they become soiled.  · Reposition your loved one's body from one side to the back to the other side every few hours to prevent bed sores. Try to minimize pressure under heels and elbows by placing a pillow or foam pads where  needed.  · Blankets can be used to warm your loved one. Although your loved one's skin may be cool, he or she is usually not aware of feeling cold. You should avoid warming your loved one with electric blankets or heating pads. These can cause burns.  · Breathing may be easier if you turn your loved one's body to the side and place pillows beneath the head and behind the back. Although labored breathing can sound very distressing to you, gurgling and rattling sounds do not cause discomfort to your loved one. An external source of oxygen may benefit some individuals. If your loved one is able to swallow, ice chips also may help. In addition, a cool mist humidifier may help make your loved one's breathing more comfortable. You may also try a fan to circulate the air.  · Contact the health care provider if the prescribed pain-relieving medicine dose does not seem adequate. With the help of the health care team, you can also explore methods such as massage and relaxation techniques to help with pain.  WHAT ARE THE SIGNS THAT THE PERSON HAS ?   · There is no breathing or pulse.  · The eyes do not move or blink, and the pupils are enlarged (dilated) and do not change with light. The eyelids may be slightly open.  · The jaw is relaxed, and the mouth is slightly open.  · The body releases the bowel and bladder contents.  · The person does not respond to being touched or spoken to.  WHAT SHOULD HAPPEN AFTER THE PERSON HAS ?   After the person has passed away, there is no need to hurry with arrangements. Family members and close friends may wish to sit with the person, talk, or pray. When the family is ready, the following steps can be taken:  1. Place your loved one on his or her back with one pillow under the head. If necessary, you may wish to put your loved one's dentures or other artificial parts in place.  2. If your loved one is in a hospice program, follow the guidelines provided by the program. You can  request a hospice nurse to verify the person's death.  3. Contact the appropriate authorities in accordance with local regulations. If your loved one has requested not to be resuscitated through a Do Not Resuscitate (DNR) order or other mechanism, do not call 911.  4. Contact your loved one's health care provider and  home.  5. Contact other family members, friends, and clergy who may not be aware of your loved one's passing.  6. Obtain emotional support to cope with the loss of your loved one.  FOR MORE INFORMATION   · National Hospice and Palliative Care Organization: http://www.nhpco.orgwww.nhpco.org  · [employer] [place] [employer]: www.alexia.nih.gov     This information is not intended to replace advice given to you by your health care provider. Make sure you discuss any questions you have with your health care provider.     Document Released: 2009 Document Revised: 2016 Document Reviewed: 2014  [employer] Interactive Patient Education © [employer]

## 2018-05-19 NOTE — ED NOTES
Patient waiting for transport home.  [facility] will pick patient up around 2100.  Paperwork provided to family

## 2018-05-19 NOTE — DISCHARGE PLANNING
1830:  [facility] dispatch and set up for them to  patient at 2100.  Family will go home with [facility] nurse to set up and wait for patient to arrive.  Spoke with Lenny at [facility].  Family requests that CM call [facility] and discontinue services.    1845:  Spoke with RADHA Ma.  Informed her of the patient going onto Hospice care.  Asked her to please d/c patient from their service as of their last visit to patient on 5/16/2018.  Francesca stated she would inform nurse that did last HH visit.

## 2018-05-19 NOTE — ASSESSMENT & PLAN NOTE
- family understand, made comfort care decision  - they wanted to get pt home, I discussed with them  - case management discussed with them as well, hospice order placed  - hospice has now discussed plan with them, likely home with hospice

## 2018-05-22 SDOH — ECONOMIC STABILITY: HOUSING INSECURITY: UNSAFE APPLIANCES: 0

## 2018-05-22 SDOH — ECONOMIC STABILITY: HOUSING INSECURITY: UNSAFE COOKING RANGE AREA: 0

## 2018-05-22 ASSESSMENT — ACTIVITIES OF DAILY LIVING (ADL)
HOME_HEALTH_OASIS: 01
OASIS_M1830: 06

## 2018-10-08 NOTE — THERAPY
"Requested Prescriptions   Pending Prescriptions Disp Refills     lisinopril (PRINIVIL/ZESTRIL) 10 MG tablet 30 tablet 0     Sig: Take 2 tablets (20 mg) by mouth daily    ACE Inhibitors (Including Combos) Protocol Failed    10/8/2018  8:49 AM       Failed - Blood pressure under 140/90 in past 12 months    BP Readings from Last 3 Encounters:   09/10/18 (!) 162/120   09/04/18 (!) 150/110   07/27/18 (!) 154/110                Failed - Recent (12 mo) or future (30 days) visit within the authorizing provider's specialty    Patient had office visit in the last 12 months or has a visit in the next 30 days with authorizing provider or within the authorizing provider's specialty.  See \"Patient Info\" tab in inbasket, or \"Choose Columns\" in Meds & Orders section of the refill encounter.           Passed - Patient is age 18 or older       Passed - Normal serum creatinine on file in past 12 months    Recent Labs   Lab Test  09/04/18   1157   CR  1.00            Passed - Normal serum potassium on file in past 12 months    Recent Labs   Lab Test  09/04/18   1157   POTASSIUM  3.4             lisinopril (PRINIVIL/ZESTRIL) 10 MG tablet  Last Written Prescription Date:  9/10/18  Last Fill Quantity: 30,  # refills: 0   Last office visit: 9/10/2018 with prescribing provider:  Carla Mullins   Future Office Visit:      " "Speech Language Therapy dysphagia treatment completed.   Functional Status: Patient still NPO/no nutrition after SLP evaluation yesterday. Patient awake and more alert than yesterday, although still requiring intermittent verbal and tactile cues to sustain alertness at times. Patient with improved neck positioning and able to hold head up better than yesterday. Patient consumed PO trials of NTL via cup sip and purees. Patient had no overt s/sx of aspiration on any PO trials. Initiation of swallow trigger was delayed up to 4 seconds at times and laryngeal elevation was palpated as weak. Patient becoming lethargic as PO trials progressed and thus, no solids were tested. No thin liquids were tested d/t hx of silent aspiration. At this time, recommend patient start NTFL diet with strict 1:1 feeding, only when patient is awake and alert. RN aware. SLP is following.     Recommendations:  At this time, recommend patient start NTFL diet with strict 1:1 feeding, only when patient is awake and alert. Crush meds in applesauce.   Plan of Care: Will benefit from Speech Therapy 3 times per week  Post-Acute Therapy: Discharge to a transitional care facility for continued skilled therapy services.    See \"Rehab Therapy-Acute\" Patient Summary Report for complete documentation.     "

## 2024-02-01 NOTE — DOCUMENTATION QUERY
DOCUMENTATION QUERY    PROVIDERS: Please select “Cosign w/ note”to reply to query.    To better represent the severity of illness of your patient, please review the following information and exercise your independent professional judgment in responding to this query.     Pulmonary edema is documented in the History and Physical. Based upon the clinical findings, risk factors, and treatment, can this diagnosis be further specified?    • Acute Pulmonary Edema  • Chronic Pulmonary Edema  • Other explanation of clinical findings (please specify)  • Unable to determine    The medical record reflects the following:   Clinical Findings Per H&P:   Acute respiratory failure with hypoxia (HCC)  I suspect this is due to pulmonary edema. She has no evidence of pneumonia on x-ray    Results Review:   Cx: Influenza A positive     CXR: 1.  Hypoinflation and elevation of the right hemidiaphragm with chronic right lung base atelectasis or scar. 2.  Pulmonary vascular congestion and probable minimal pulmonary edema.   ECHO: EF 60%. Indeterminate diastolic function. Mild concentric LVH. RSVP 35 mmHg.    Treatment Tamiflu, IV Lasix 20mg x 1 dose, supplemental O2, incentive spirometry, CXR, BNP, & echocardiogram   Risk Factors Age, Influenza A, severe sepsis, acute respiratory failure, & acute metabolic encephalopathy   Location within medical record History & Physical, Progress Notes, Lab Results, & Radiology Results     Thank you,   Daniela Goldsmith RN  Clinical   Phone [phone]         Billing Type: Third-Party Bill Bill For Surgical Tray: no Expected Date Of Service: 12/01/2023